# Patient Record
Sex: FEMALE | HISPANIC OR LATINO | Employment: FULL TIME | URBAN - METROPOLITAN AREA
[De-identification: names, ages, dates, MRNs, and addresses within clinical notes are randomized per-mention and may not be internally consistent; named-entity substitution may affect disease eponyms.]

---

## 2019-04-01 ENCOUNTER — OUTSIDE FACILITY SERVICE (OUTPATIENT)
Dept: NEUROLOGY | Facility: CLINIC | Age: 24
End: 2019-04-01

## 2019-04-01 PROCEDURE — 95886 MUSC TEST DONE W/N TEST COMP: CPT | Performed by: PSYCHIATRY & NEUROLOGY

## 2019-04-01 PROCEDURE — 95910 NRV CNDJ TEST 7-8 STUDIES: CPT | Performed by: PSYCHIATRY & NEUROLOGY

## 2020-09-04 ENCOUNTER — OFFICE VISIT (OUTPATIENT)
Dept: OBSTETRICS AND GYNECOLOGY | Facility: CLINIC | Age: 25
End: 2020-09-04

## 2020-09-04 VITALS
HEIGHT: 62 IN | DIASTOLIC BLOOD PRESSURE: 66 MMHG | HEART RATE: 89 BPM | BODY MASS INDEX: 28.71 KG/M2 | WEIGHT: 156 LBS | SYSTOLIC BLOOD PRESSURE: 106 MMHG

## 2020-09-04 DIAGNOSIS — Z30.09 ENCOUNTER FOR COUNSELING REGARDING CONTRACEPTION: ICD-10-CM

## 2020-09-04 DIAGNOSIS — N76.0 VAGINITIS AND VULVOVAGINITIS: ICD-10-CM

## 2020-09-04 DIAGNOSIS — Z01.419 WELL WOMAN EXAM WITH ROUTINE GYNECOLOGICAL EXAM: Primary | ICD-10-CM

## 2020-09-04 DIAGNOSIS — N64.52 NIPPLE DISCHARGE: ICD-10-CM

## 2020-09-04 PROCEDURE — 99385 PREV VISIT NEW AGE 18-39: CPT | Performed by: STUDENT IN AN ORGANIZED HEALTH CARE EDUCATION/TRAINING PROGRAM

## 2020-09-04 PROCEDURE — 99213 OFFICE O/P EST LOW 20 MIN: CPT | Performed by: STUDENT IN AN ORGANIZED HEALTH CARE EDUCATION/TRAINING PROGRAM

## 2020-09-04 RX ORDER — VALACYCLOVIR HYDROCHLORIDE 500 MG/1
TABLET, FILM COATED ORAL
COMMUNITY
Start: 2020-08-24 | End: 2020-09-26

## 2020-09-04 RX ORDER — TOPIRAMATE 50 MG/1
TABLET, FILM COATED ORAL
COMMUNITY
Start: 2020-06-03

## 2020-09-04 RX ORDER — SUMATRIPTAN 100 MG/1
TABLET, FILM COATED ORAL
COMMUNITY
Start: 2019-08-12 | End: 2023-02-23

## 2020-09-04 RX ORDER — ESCITALOPRAM OXALATE 10 MG/1
10 TABLET ORAL DAILY
COMMUNITY
Start: 2020-08-24 | End: 2023-03-10

## 2020-09-04 RX ORDER — CETIRIZINE HYDROCHLORIDE 10 MG/1
10 TABLET ORAL
COMMUNITY
Start: 2020-03-26 | End: 2023-03-10

## 2020-09-04 NOTE — PROGRESS NOTES
GYN Annual Exam     Chief Complaint   Patient presents with   • Follow-up     c/o irregular bleeding with Nexplanon that was inserted 3 years ago.  C/o vuluvar pain and burning with intercourse for 2 years and frequent vaginal infections. Also c/o still producing breast milk.         Parveen Hernandez is a 25 y.o. female who presents for annual well woman exam. She has multiple complaints today. She complains of irregular menstrual bleeding with a Nexplanon in place. It was inserted 3 years ago and she wishes it to be removed because of irregular bleeding. She reports that she bleeds more often than she is not bleeding and most of her bleeding is light. She also complains of frequent yeast infections over the last year but no recent symptoms. She also reports that for the past 2 years, she has been experiencing increased vulvar and vaginal swelling during and after intercourse. She reports that it is not as bad when her and her partner use condoms. She reports burns with urination just following intercourse. She has been with the same partner for several years. Lastly, she reports breast tenderness with her periods that has occurred since her last delivery. She massages her breasts for comfort because it feels like milk let down and her breasts express milky discharge, right breast > left breast.    OB History        1    Para   1    Term   1            AB        Living   1       SAB        TAB        Ectopic        Molar        Multiple        Live Births                    Current contraception: Nexplanon  History of abnormal Pap smear: no  Family history of uterine, colon or ovarian cancer: no  History of abnormal mammogram: no  Family history of breast cancer: no  Last Pap: Reports this year and normal.     Past Medical History:   Diagnosis Date   • Anxiety    • H/O cold sores    • Migraines        Past Surgical History:   Procedure Laterality Date   •  SECTION     • TONSILLECTOMY    "        Current Outpatient Medications:   •  cetirizine (zyrTEC) 10 MG tablet, Take 10 mg by mouth., Disp: , Rfl:   •  escitalopram (LEXAPRO) 10 MG tablet, Take 10 mg by mouth Daily., Disp: , Rfl:   •  SUMAtriptan (IMITREX) 100 MG tablet, , Disp: , Rfl:   •  topiramate (TOPAMAX) 50 MG tablet, TAKE 1 TABLET BY MOUTH EVERY NIGHT AS DIRECTED, Disp: , Rfl:   •  valACYclovir (VALTREX) 500 MG tablet, Take  by mouth., Disp: , Rfl:     No Known Allergies    Social History     Tobacco Use   • Smoking status: Never Smoker   • Smokeless tobacco: Never Used   Substance Use Topics   • Alcohol use: Yes     Comment: social   • Drug use: Never       Family History   Problem Relation Age of Onset   • Diabetes Maternal Grandfather        Review of Systems   Constitutional: Negative for chills and fever.   HENT: Negative for congestion and sore throat.    Eyes: Negative for blurred vision and double vision.   Respiratory: Negative for cough and shortness of breath.    Cardiovascular: Negative for chest pain and leg swelling.   Gastrointestinal: Negative for abdominal pain, constipation and diarrhea.   Endocrine: Negative for cold intolerance and heat intolerance.   Genitourinary: Positive for breast discharge, breast pain, dysuria, menstrual problem, vaginal discharge and vaginal pain. Negative for difficulty urinating, frequency and urgency.   Musculoskeletal: Negative for arthralgias and myalgias.   Skin: Negative for rash and bruise.   Allergic/Immunologic: Negative for environmental allergies and food allergies.   Neurological: Negative for dizziness and headache.   Hematological: Negative for adenopathy. Does not bruise/bleed easily.   Psychiatric/Behavioral: Negative for agitation. The patient is not nervous/anxious.        /66   Pulse 89   Ht 157.5 cm (62\")   Wt 70.8 kg (156 lb)   LMP 08/18/2020 (Approximate)   Breastfeeding No   BMI 28.53 kg/m²     Physical Exam   Constitutional: She is oriented to person, place, " and time. She appears well-developed and well-nourished. No distress.   HENT:   Head: Normocephalic and atraumatic.   Right Ear: External ear normal.   Left Ear: External ear normal.   Eyes: Conjunctivae and EOM are normal.   Neck: Normal range of motion. Neck supple.   Cardiovascular: Normal rate and regular rhythm.   Pulmonary/Chest: Effort normal. No respiratory distress.   Homogenous breasts. Nipples normal appearing without discharge. No skin changes. Breasts non-tender and no discrete masses palpated. No axillary adenopathy.    Abdominal: Soft. She exhibits no distension and no mass. There is no tenderness. There is no rebound and no guarding.   Genitourinary:   Genitourinary Comments: Normal external female genitalia, normal vulva, well estrogenized vaginal mucosa, cervix normal without lesions. Normal size, mobile, non-tender uterus. No adnexal masses or tenderness.    Musculoskeletal: Normal range of motion. She exhibits no deformity.   Neurological: She is alert and oriented to person, place, and time.   Skin: Skin is warm and dry.   Psychiatric: She has a normal mood and affect. Her behavior is normal.        Assessment     1) GYN annual well woman exam.   2) Bilateral breast tenderness   3) Vaginitis   4) Irregular menstrual bleeding  5) Contraception      Plan     1) Breast Health - Clinical breast exam yearly, Self breast awareness monthly. We discussed that her breast tenderness with menstrual cycles is likely related to hormonal changes. Will order prolactin to rule out hyperprolactinemia. Suspect induced lactation via breast stimulation.   2) Pap - Up to date  3) Smoking status- non-tender  4) Activity recommends - Adult 150-300 min/week of multi-component physical activities that include balance training, aerobic and physical strengthening.    Avoidance of distracted driving issues (texts, phone calls).   5) Follow up prn and one year.   6) Vulvovaginitis- No signs of inflammation or infection  noted on exam. We discussed that sexual arousal involves increased blood flow and swelling of the vagina and vulva. However, the patient reports this has worsened over the last 2 years. Given patient's history and improvement with condoms, suspect possible semen allergy. Advised use of condoms with sexual intercourse.   7) Irregular menstrual bleeding/contraception- We discussed that her menstrual bleeding pattern is likely iatrogenic related to Nexplanon implant. We discussed other forms of contraception with less risk of irregular menstrual bleeding including pills, rings, patches, injections, and intrauterine devices. Patient elects to proceed with IUD placement and will return to clinic for placement.       Estela Bo MD  9/7/2020  14:24

## 2020-09-07 PROBLEM — Z01.419 WELL WOMAN EXAM: Status: ACTIVE | Noted: 2020-09-04

## 2020-11-02 ENCOUNTER — TELEPHONE (OUTPATIENT)
Dept: OBSTETRICS AND GYNECOLOGY | Facility: CLINIC | Age: 25
End: 2020-11-02

## 2020-12-08 ENCOUNTER — TELEPHONE (OUTPATIENT)
Dept: OBSTETRICS AND GYNECOLOGY | Facility: CLINIC | Age: 25
End: 2020-12-08

## 2020-12-08 NOTE — TELEPHONE ENCOUNTER
Message left for patient to call office. She never had Prolactin level drawn and wanted to see if she was still interested in birth control device?

## 2020-12-10 ENCOUNTER — OFFICE VISIT (OUTPATIENT)
Dept: OBSTETRICS AND GYNECOLOGY | Facility: CLINIC | Age: 25
End: 2020-12-10

## 2020-12-10 VITALS
BODY MASS INDEX: 29.08 KG/M2 | SYSTOLIC BLOOD PRESSURE: 108 MMHG | WEIGHT: 158 LBS | HEIGHT: 62 IN | DIASTOLIC BLOOD PRESSURE: 64 MMHG

## 2020-12-10 DIAGNOSIS — Z30.46 ENCOUNTER FOR NEXPLANON REMOVAL: ICD-10-CM

## 2020-12-10 DIAGNOSIS — Z30.430 ENCOUNTER FOR INSERTION OF PARAGARD IUD: Primary | ICD-10-CM

## 2020-12-10 LAB
B-HCG UR QL: NEGATIVE
INTERNAL NEGATIVE CONTROL: NEGATIVE
INTERNAL POSITIVE CONTROL: POSITIVE
Lab: NORMAL

## 2020-12-10 PROCEDURE — 11982 REMOVE DRUG IMPLANT DEVICE: CPT | Performed by: STUDENT IN AN ORGANIZED HEALTH CARE EDUCATION/TRAINING PROGRAM

## 2020-12-10 PROCEDURE — 58300 INSERT INTRAUTERINE DEVICE: CPT | Performed by: STUDENT IN AN ORGANIZED HEALTH CARE EDUCATION/TRAINING PROGRAM

## 2020-12-10 PROCEDURE — 81025 URINE PREGNANCY TEST: CPT | Performed by: STUDENT IN AN ORGANIZED HEALTH CARE EDUCATION/TRAINING PROGRAM

## 2020-12-10 RX ADMIN — COPPER: 313.4 INTRAUTERINE DEVICE INTRAUTERINE at 15:00

## 2020-12-10 NOTE — PROGRESS NOTES
Procedure Note     Pre-Operative Diagnosis: 1. Desire for removal of Nexplanon device from left arm    Post-Operative Diagnosis: Same     Procedure: Nexplanon removal    Anesthetic: 2mL 1% plain Xylocaine     Estimated Blood Loss: Nil     Complications: no complications were noted     Counseling: Patient was seen and counseled.  Patient desires removal of the implant device because it has  and she desires to use a ParaGard intrauterine device.  She was counseled regarding ParaGard intrauterine device. The risks of removal were reviewed including bleeding, infection, damage to surrounding structures, and scarring.    Description of Procedure:   Patient was consented for procedure; questions were answered. The implant was identified in her left upper extremity . The area of the distal implant was cleaned with alcohol and injected with 2mL of 1% lidocaine.  The skin was prepped with betadine and incised approximately 0.4 cm perpendicular to the plane of the implant.  The implant was identified and grasped with a hemostat. The overlying capsule was dissected off with a hemostat, and the implant was removed in its entirety. The incision was covered with steri strips and a pressure dressing.  Patient tolerated the procedure well.     Estela Bo MD

## 2020-12-12 PROBLEM — Z30.430 ENCOUNTER FOR IUD INSERTION: Status: ACTIVE | Noted: 2020-12-10

## 2020-12-12 RX ORDER — COPPER 313.4 MG/1
INTRAUTERINE DEVICE INTRAUTERINE ONCE
Status: COMPLETED | OUTPATIENT
Start: 2020-12-12 | End: 2020-12-10

## 2020-12-12 NOTE — PROGRESS NOTES
ParaGard IUD Insertion Procedure Note    Indications: Contraception    Pre Procedural Diagnosis: Encounter for insertion of ParaGard intrauterine device     Post Procedural Diagnosis: Successful encounter for insertion of ParaGard intrauterine device      Counseling:  Patient was counseled on risks of IUD insertion including but not limited to abnormal bleeding, infection, uterine perforation, expulsion, failure of contraception resulting in pregnancy, need for additional procedures and/or need for surgery.  All questions were answered to apparent satisfaction.  She was counseled about the duration of treatment, recommended removal in 10 years.  She was advised to perform monthly string checks and to see evaluation with unexpected changes in bleeding patterns and/or unable to feel the strings.      Procedure Details   Urine pregnancy test was done, and result was negative.  Gonorrhea/chlamydia testing was not done.    Bimanual exam revealed normal size, anteverted uterus. Cervix cleansed with Betadine. Tenaculum applied to the anterior lip.  Uterus sounded to 8 cm. IUD inserted without difficulty. String visible and trimmed. Patient tolerated procedure well.    IUD Information:  See medication record.    Condition:  Stable    Complications:  None    Plan:    The patient was advised to call for any fever or for prolonged or severe pain or bleeding; she was also advised to use a back up method of contraception for 7 days or abstain from intercourse. She was advised to use OTC ibuprofen as needed for mild to moderate pain.  Return to the clinic in 4 weeks for follow-up.    Estela Bo MD

## 2020-12-23 ENCOUNTER — TELEPHONE (OUTPATIENT)
Dept: OBSTETRICS AND GYNECOLOGY | Facility: CLINIC | Age: 25
End: 2020-12-23

## 2020-12-23 DIAGNOSIS — N76.0 BV (BACTERIAL VAGINOSIS): Primary | ICD-10-CM

## 2020-12-23 DIAGNOSIS — B96.89 BV (BACTERIAL VAGINOSIS): Primary | ICD-10-CM

## 2020-12-23 RX ORDER — METRONIDAZOLE 500 MG/1
500 TABLET ORAL 2 TIMES DAILY
Qty: 14 TABLET | Refills: 0 | Status: SHIPPED | OUTPATIENT
Start: 2020-12-23 | End: 2020-12-30

## 2020-12-23 NOTE — TELEPHONE ENCOUNTER
Patient called stating that she thinks she has BV and would like something called into the pharmacy.    Patient is complaining of an odor but no discharge.      Thanks,    Marisa

## 2021-01-07 ENCOUNTER — OFFICE VISIT (OUTPATIENT)
Dept: OBSTETRICS AND GYNECOLOGY | Facility: CLINIC | Age: 26
End: 2021-01-07

## 2021-01-07 VITALS
DIASTOLIC BLOOD PRESSURE: 65 MMHG | SYSTOLIC BLOOD PRESSURE: 111 MMHG | WEIGHT: 158 LBS | BODY MASS INDEX: 29.08 KG/M2 | HEIGHT: 62 IN

## 2021-01-07 DIAGNOSIS — Z97.5 IUD (INTRAUTERINE DEVICE) IN PLACE: Primary | ICD-10-CM

## 2021-01-07 PROCEDURE — 99212 OFFICE O/P EST SF 10 MIN: CPT | Performed by: STUDENT IN AN ORGANIZED HEALTH CARE EDUCATION/TRAINING PROGRAM

## 2021-01-07 NOTE — PROGRESS NOTES
"String Check  Chief Complaint   Patient presents with   • Follow-up     paragard check, doing good      Parveen Hernandez is a 25 y.o.  who presented after placement of ParaGard IUD on 12/10/20. She reports that she had her period the day after placement that lasted 3 weeks. It has since stopped and she reports that she is doing well without complaints today.      OBJECTIVE:  /65   Ht 157.5 cm (62.01\")   Wt 71.7 kg (158 lb)   BMI 28.89 kg/m²    Gen: NAD  Pelvic: IUD strings seen and appear appropriate in length. No cervical motion tenderness. Uterus non-tender, mobile, normal size. No adnexal masses or tenderness.     ASSESSMENT:   IUD in place    PLAN:   Patient doing well with IUD in place. We again discussed that the ParaGard IUD commonly has side effect of heavy periods. Patient to contact office with issues or concerns. Follow up in 1 year for annual exam or earlier as indicated.    Estela Bo MD     "

## 2021-01-28 ENCOUNTER — OFFICE VISIT (OUTPATIENT)
Dept: OBSTETRICS AND GYNECOLOGY | Facility: CLINIC | Age: 26
End: 2021-01-28

## 2021-01-28 VITALS
HEIGHT: 62 IN | WEIGHT: 158 LBS | SYSTOLIC BLOOD PRESSURE: 108 MMHG | BODY MASS INDEX: 29.08 KG/M2 | DIASTOLIC BLOOD PRESSURE: 59 MMHG

## 2021-01-28 DIAGNOSIS — R10.9 ABDOMINAL CRAMPING: ICD-10-CM

## 2021-01-28 DIAGNOSIS — Z97.5 BREAKTHROUGH BLEEDING ASSOCIATED WITH INTRAUTERINE DEVICE (IUD): Primary | ICD-10-CM

## 2021-01-28 DIAGNOSIS — N89.8 VAGINAL DISCHARGE: ICD-10-CM

## 2021-01-28 DIAGNOSIS — N92.1 BREAKTHROUGH BLEEDING ASSOCIATED WITH INTRAUTERINE DEVICE (IUD): Primary | ICD-10-CM

## 2021-01-28 PROCEDURE — 99213 OFFICE O/P EST LOW 20 MIN: CPT | Performed by: STUDENT IN AN ORGANIZED HEALTH CARE EDUCATION/TRAINING PROGRAM

## 2021-02-01 LAB
A VAGINAE DNA VAG QL NAA+PROBE: ABNORMAL SCORE
BVAB2 DNA VAG QL NAA+PROBE: ABNORMAL SCORE
C ALBICANS DNA VAG QL NAA+PROBE: NEGATIVE
C GLABRATA DNA VAG QL NAA+PROBE: NEGATIVE
MEGA1 DNA VAG QL NAA+PROBE: ABNORMAL SCORE

## 2021-02-02 ENCOUNTER — TELEPHONE (OUTPATIENT)
Dept: OBSTETRICS AND GYNECOLOGY | Facility: CLINIC | Age: 26
End: 2021-02-02

## 2021-02-02 DIAGNOSIS — N76.0 RECURRENT VAGINITIS: Primary | ICD-10-CM

## 2021-02-02 DIAGNOSIS — B96.89 BV (BACTERIAL VAGINOSIS): ICD-10-CM

## 2021-02-02 DIAGNOSIS — N76.0 BV (BACTERIAL VAGINOSIS): ICD-10-CM

## 2021-02-02 RX ORDER — ACETAMINOPHEN AND CODEINE PHOSPHATE 120; 12 MG/5ML; MG/5ML
1 SOLUTION ORAL DAILY
Qty: 28 TABLET | Refills: 0 | Status: SHIPPED | OUTPATIENT
Start: 2021-02-02 | End: 2021-02-11

## 2021-02-02 RX ORDER — METRONIDAZOLE 500 MG/1
500 TABLET ORAL 2 TIMES DAILY
Qty: 14 TABLET | Refills: 0 | Status: SHIPPED | OUTPATIENT
Start: 2021-02-02 | End: 2021-02-09

## 2021-02-02 RX ORDER — BORIC ACID
600 POWDER (GRAM) MISCELLANEOUS 2 TIMES WEEKLY
Qty: 24 SUPPOSITORY | Refills: 4 | Status: SHIPPED | OUTPATIENT
Start: 2021-02-04 | End: 2021-02-11

## 2021-02-02 NOTE — TELEPHONE ENCOUNTER
Called patient regarding results of ultrasound and vaginal swab. The patient report that her bleeding has since stopped. I discussed that if she has prolonged bleeding again, she may take micronor for one month to see if it can help regulate her bleeding from the ParaGard. Reviewed that the pelvic ultrasound showed that the IUD was in the appropriate position. Also reviewed vaginal swab result that showed BV. Sent prescription for flagyl 500 mg BID x 7 days. Once she has finished this the patient is to start boric acid vaginal suppositories twice a week for treatment of recurrent vaginitis. All questions and concerns answered.    Estela Bo MD  2/2/2021  17:02 EST

## 2021-02-11 ENCOUNTER — OFFICE VISIT (OUTPATIENT)
Dept: OBSTETRICS AND GYNECOLOGY | Facility: CLINIC | Age: 26
End: 2021-02-11

## 2021-02-11 VITALS
HEART RATE: 100 BPM | SYSTOLIC BLOOD PRESSURE: 121 MMHG | HEIGHT: 62 IN | DIASTOLIC BLOOD PRESSURE: 73 MMHG | WEIGHT: 161 LBS | BODY MASS INDEX: 29.63 KG/M2

## 2021-02-11 DIAGNOSIS — Z97.5 IUD (INTRAUTERINE DEVICE) IN PLACE: ICD-10-CM

## 2021-02-11 DIAGNOSIS — N89.8 VAGINAL DISCHARGE: Primary | ICD-10-CM

## 2021-02-11 DIAGNOSIS — R10.2 PELVIC PAIN: ICD-10-CM

## 2021-02-11 PROCEDURE — 99213 OFFICE O/P EST LOW 20 MIN: CPT | Performed by: STUDENT IN AN ORGANIZED HEALTH CARE EDUCATION/TRAINING PROGRAM

## 2021-02-11 RX ORDER — ESCITALOPRAM OXALATE 10 MG/1
10 TABLET ORAL DAILY
COMMUNITY
Start: 2020-08-24 | End: 2022-12-12

## 2021-02-11 NOTE — PROGRESS NOTES
"CC: Vaginal discharge and irregular bleeding with ParaGard IUD     SUBJECTIVE:     Parveen Hernandez is a 25 y.o.  who presents with pink discharge and irregular bleeding with ParaGard IUD. The patient was last seen on 21 for breakthrough bleeding with the ParaGard IUD and pelvic pain. The IUD was noted to be in appropriate position on ultrasound and she was treated for bacterial vaginosis at that time. She feels like she still might have a vaginal infection because she is having pink vaginal discharge after finishing flagyl antibiotic two days ago. It is off and on foul smelling. Denies vaginal irritation or itching. She also has pelvic pain that is intermittent and sharp. It radiates down her left leg occasionally. She has not taken in medication. The last time she experienced it was this morning. She was having vaginal bleeding every day for the last month but this has improved and she had a regular period earlier this month.     Past Medical History:   Diagnosis Date   • Anxiety    • H/O cold sores    • Migraines       Past Surgical History:   Procedure Laterality Date   •  SECTION     • TONSILLECTOMY        Social History     Tobacco Use   • Smoking status: Never Smoker   • Smokeless tobacco: Never Used   Substance Use Topics   • Alcohol use: Yes     Comment: social   • Drug use: Never     OB History    Para Term  AB Living   1 1 1     1   SAB TAB Ectopic Molar Multiple Live Births                    # Outcome Date GA Lbr Bello/2nd Weight Sex Delivery Anes PTL Lv   1 Term                 Review of Systems   Gastrointestinal: Positive for abdominal pain.   Genitourinary: Positive for pelvic pain and vaginal discharge.       OBJECTIVE:   Vitals:    21 1200   BP: 121/73   Pulse: 100   Weight: 73 kg (161 lb)   Height: 157.5 cm (62\")        Physical Exam  Vitals signs reviewed. Exam conducted with a chaperone present.   Constitutional:       Appearance: Normal appearance.   HENT: "      Head: Normocephalic and atraumatic.      Right Ear: External ear normal.      Left Ear: External ear normal.   Eyes:      Extraocular Movements: Extraocular movements intact.      Pupils: Pupils are equal, round, and reactive to light.   Neck:      Musculoskeletal: Normal range of motion and neck supple.   Pulmonary:      Effort: Pulmonary effort is normal. No respiratory distress.   Abdominal:      General: There is no distension.      Palpations: Abdomen is soft. There is no mass.      Tenderness: There is no abdominal tenderness. There is no guarding or rebound.      Hernia: No hernia is present.   Genitourinary:     General: Normal vulva.      Exam position: Lithotomy position.      Labia:         Right: No rash, tenderness, lesion or injury.         Left: No rash, tenderness, lesion or injury.       Urethra: No prolapse or urethral swelling.      Vagina: Vaginal discharge present. No tenderness, bleeding or lesions.      Cervix: Normal.      Uterus: Normal. Not enlarged, not fixed and not tender.       Adnexa: Right adnexa normal and left adnexa normal.        Right: No mass, tenderness or fullness.          Left: No mass, tenderness or fullness.        Comments: IUD strings visualized at cervical os at appropriate length.   Yellow thin vaginal discharge in the vault.   Musculoskeletal: Normal range of motion.         General: No swelling.   Lymphadenopathy:      Lower Body: No right inguinal adenopathy. No left inguinal adenopathy.   Skin:     General: Skin is warm and dry.   Neurological:      General: No focal deficit present.      Mental Status: She is alert and oriented to person, place, and time.   Psychiatric:         Mood and Affect: Mood normal.         Behavior: Behavior normal.         ASSESSMENT:     ICD-10-CM ICD-9-CM   1. Vaginal discharge  N89.8 623.5   2. Pelvic pain  R10.2 [id number]   3. IUD (intrauterine device) in place  Z97.5 V45.51       PLAN:   NuSwab + collected to evaluate ongoing  discharge that did not improve with recent flagyl treatment. Will notify patient of results and need for further treatment.  We discussed that her pelvic pain may be related to the IUD and she may be sensitive to it in her uterus. Placement in the uterus was confirmed previously with ultrasound at her last appointment on 1/28/21. Will see if pain is possibly related to pelvic infection and advised the patient to continue to monitor her pain and take ibuprofen as needed. Consider removal of IUD if this pain is going and considerably bothersome to the patient in the next few months. Patient agrees with plan of care and will notify the office of concerns. All questions answered.    See below for orders    Orders Placed This Encounter   Procedures   • NuSwab VG+ - Swab, Vagina      No follow-ups on file.     Estela Bo MD

## 2021-02-13 LAB
A VAGINAE DNA VAG QL NAA+PROBE: ABNORMAL SCORE
BVAB2 DNA VAG QL NAA+PROBE: ABNORMAL SCORE
C ALBICANS DNA VAG QL NAA+PROBE: NEGATIVE
C GLABRATA DNA VAG QL NAA+PROBE: NEGATIVE
C TRACH DNA VAG QL NAA+PROBE: POSITIVE
MEGA1 DNA VAG QL NAA+PROBE: ABNORMAL SCORE
N GONORRHOEA DNA VAG QL NAA+PROBE: NEGATIVE
T VAGINALIS DNA VAG QL NAA+PROBE: NEGATIVE

## 2021-02-15 ENCOUNTER — TELEPHONE (OUTPATIENT)
Dept: OBSTETRICS AND GYNECOLOGY | Facility: CLINIC | Age: 26
End: 2021-02-15

## 2021-02-15 RX ORDER — AZITHROMYCIN 500 MG/1
1000 TABLET, FILM COATED ORAL ONCE
Qty: 2 TABLET | Refills: 0 | Status: SHIPPED | OUTPATIENT
Start: 2021-02-15 | End: 2021-02-15

## 2021-02-15 NOTE — TELEPHONE ENCOUNTER
Called patient and reviewed recent vaginal swab result that demonstrated chlamydia. Reviewed that this is a sexually transmitted disease that if not treated can result in pelvic inflammatory disease and infertility. Reviewed treatment with azithromycin 1000 mg PO x 1 dose and sent prescription to patient's pharmacy. Patient advised to tell partner and have partner seek treatment. They must abstain from intercourse for 7 days following treatment. All questions answered.    Estela Bo MD  2/15/2021  16:29 EST

## 2021-02-17 ENCOUNTER — TELEPHONE (OUTPATIENT)
Dept: OBSTETRICS AND GYNECOLOGY | Facility: CLINIC | Age: 26
End: 2021-02-17

## 2021-02-28 DIAGNOSIS — Z97.5 BREAKTHROUGH BLEEDING ASSOCIATED WITH INTRAUTERINE DEVICE (IUD): ICD-10-CM

## 2021-02-28 DIAGNOSIS — N92.1 BREAKTHROUGH BLEEDING ASSOCIATED WITH INTRAUTERINE DEVICE (IUD): ICD-10-CM

## 2021-03-01 RX ORDER — ACETAMINOPHEN AND CODEINE PHOSPHATE 120; 12 MG/5ML; MG/5ML
1 SOLUTION ORAL DAILY
Qty: 28 TABLET | Refills: 0 | OUTPATIENT
Start: 2021-03-01 | End: 2022-03-01

## 2021-03-30 ENCOUNTER — TELEPHONE (OUTPATIENT)
Dept: OBSTETRICS AND GYNECOLOGY | Facility: CLINIC | Age: 26
End: 2021-03-30

## 2021-03-30 NOTE — TELEPHONE ENCOUNTER
Called pt about n/s on 3/26/21, pt states she was told that  was not going to make it, pt will call back and [email or web address]

## 2021-04-14 ENCOUNTER — OFFICE VISIT (OUTPATIENT)
Dept: OBSTETRICS AND GYNECOLOGY | Facility: CLINIC | Age: 26
End: 2021-04-14

## 2021-04-14 VITALS
SYSTOLIC BLOOD PRESSURE: 107 MMHG | DIASTOLIC BLOOD PRESSURE: 74 MMHG | WEIGHT: 158 LBS | BODY MASS INDEX: 29.08 KG/M2 | HEIGHT: 62 IN

## 2021-04-14 DIAGNOSIS — N89.8 VAGINAL DISCHARGE: Primary | ICD-10-CM

## 2021-04-14 DIAGNOSIS — Z11.3 SCREENING EXAMINATION FOR STD (SEXUALLY TRANSMITTED DISEASE): ICD-10-CM

## 2021-04-14 DIAGNOSIS — Z86.19 HISTORY OF CHLAMYDIA: ICD-10-CM

## 2021-04-14 PROCEDURE — 99213 OFFICE O/P EST LOW 20 MIN: CPT | Performed by: STUDENT IN AN ORGANIZED HEALTH CARE EDUCATION/TRAINING PROGRAM

## 2021-04-14 RX ORDER — FLUCONAZOLE 150 MG/1
150 TABLET ORAL ONCE
Qty: 1 TABLET | Refills: 0 | Status: SHIPPED | OUTPATIENT
Start: 2021-04-14 | End: 2021-04-14

## 2021-04-14 NOTE — PROGRESS NOTES
"Chief Complaint   Patient presents with   • Follow-up     recheck swab        SUBJECTIVE:     Parveen Hernandez is a 25 y.o.  who presents for recheck for vaginitis. The patient was last seen in 21 and tested positive for chlamydia. She has taken treatment since that time but desires to be rechecked for infection. She states that her partner was checked and he tested negative. She reports that she has been with the same partner for the last 8 months. She is wondering if she got it from him or before that. She has history of recurrent bacterial vaginosis and is having discharge at this time. She reports it is white and foul smelling. Denies vaginal itching or irritation. She has a ParaGard IUD in place.     Past Medical History:   Diagnosis Date   • Anxiety    • H/O cold sores    • Migraines       Past Surgical History:   Procedure Laterality Date   •  SECTION     • TONSILLECTOMY        Social History     Tobacco Use   • Smoking status: Never Smoker   • Smokeless tobacco: Never Used   Substance Use Topics   • Alcohol use: Yes     Comment: social   • Drug use: Never     OB History    Para Term  AB Living   1 1 1     1   SAB TAB Ectopic Molar Multiple Live Births                    # Outcome Date GA Lbr Bello/2nd Weight Sex Delivery Anes PTL Lv   1 Term                 Review of Systems   Genitourinary: Positive for vaginal discharge. Negative for menstrual problem and vaginal bleeding.       OBJECTIVE:   Vitals:    21 1432   BP: 107/74   Weight: 71.7 kg (158 lb)   Height: 157.5 cm (62.01\")        Physical Exam  Vitals reviewed. Exam conducted with a chaperone present.   Constitutional:       Appearance: Normal appearance.   HENT:      Head: Normocephalic and atraumatic.      Right Ear: External ear normal.      Left Ear: External ear normal.   Eyes:      Extraocular Movements: Extraocular movements intact.      Pupils: Pupils are equal, round, and reactive to light.   Pulmonary:      " Effort: Pulmonary effort is normal. No respiratory distress.   Abdominal:      General: There is no distension.      Palpations: Abdomen is soft.      Tenderness: There is no abdominal tenderness. There is no guarding or rebound.   Genitourinary:     General: Normal vulva.      Exam position: Lithotomy position.      Labia:         Right: No rash, tenderness, lesion or injury.         Left: No rash, tenderness, lesion or injury.       Urethra: No prolapse or urethral swelling.      Vagina: Vaginal discharge present. No erythema, tenderness, bleeding or lesions.      Cervix: Normal.      Uterus: Normal. Not enlarged, not fixed and not tender.       Adnexa: Right adnexa normal and left adnexa normal.        Right: No mass, tenderness or fullness.          Left: No mass, tenderness or fullness.        Comments: Thick white discharge in vaginal vault.   Musculoskeletal:         General: No deformity. Normal range of motion.      Cervical back: Normal range of motion and neck supple.   Lymphadenopathy:      Lower Body: No right inguinal adenopathy. No left inguinal adenopathy.   Skin:     General: Skin is warm and dry.   Neurological:      General: No focal deficit present.      Mental Status: She is alert and oriented to person, place, and time.   Psychiatric:         Mood and Affect: Mood normal.         Behavior: Behavior normal.         ASSESSMENT:     ICD-10-CM ICD-9-CM   1. Vaginal discharge  N89.8 623.5   2. Screening examination for STD (sexually transmitted disease)  Z11.3 V74.5   3. History of chlamydia  Z86.19 V12.09       PLAN:   Nuab+ collected for evaluation for vaginitis causing vaginal discharge and STD screening given past history of chlamydia. We discussed that chlamydia is sexually transmitted and she may have acquired it prior to her current partner because he tested negative and chlamydia can be an indolent infection in females. Will presumptive treat for yeast infection based on exam with diflucan  150 mg PO x 1 dose. Will notify patient of results of vaginal swab. All questions and concerns answered. F/u as needed.     See below for orders    Orders Placed This Encounter   Procedures   • NuSwab VG+ - Swab, Vagina     Order Specific Question:   Release to patient     Answer:   Immediate     Estela Bo MD

## 2021-04-17 LAB
A VAGINAE DNA VAG QL NAA+PROBE: ABNORMAL SCORE
BVAB2 DNA VAG QL NAA+PROBE: ABNORMAL SCORE
C ALBICANS DNA VAG QL NAA+PROBE: NEGATIVE
C GLABRATA DNA VAG QL NAA+PROBE: NEGATIVE
C TRACH DNA VAG QL NAA+PROBE: NEGATIVE
MEGA1 DNA VAG QL NAA+PROBE: ABNORMAL SCORE
N GONORRHOEA DNA VAG QL NAA+PROBE: NEGATIVE
T VAGINALIS DNA VAG QL NAA+PROBE: NEGATIVE

## 2021-04-19 DIAGNOSIS — B96.89 BV (BACTERIAL VAGINOSIS): Primary | ICD-10-CM

## 2021-04-19 DIAGNOSIS — N76.0 BV (BACTERIAL VAGINOSIS): Primary | ICD-10-CM

## 2021-04-19 RX ORDER — METRONIDAZOLE 500 MG/1
500 TABLET ORAL 2 TIMES DAILY
Qty: 14 TABLET | Refills: 0 | Status: SHIPPED | OUTPATIENT
Start: 2021-04-19 | End: 2021-04-26

## 2021-05-13 DIAGNOSIS — N64.52 NIPPLE DISCHARGE: Primary | ICD-10-CM

## 2021-05-13 NOTE — PROGRESS NOTES
Spoke with pt because she was on the nurse schedule for a Prolactin. Our  had to leave for an emergency, I noticed order had , pt states she is still having breast leakage and she still wanted to do tests. Order placed and patient will come in at her convenience.

## 2022-08-02 ENCOUNTER — TELEPHONE (OUTPATIENT)
Dept: OBSTETRICS AND GYNECOLOGY | Facility: CLINIC | Age: 27
End: 2022-08-02

## 2022-08-26 ENCOUNTER — OFFICE VISIT (OUTPATIENT)
Dept: OBSTETRICS AND GYNECOLOGY | Facility: CLINIC | Age: 27
End: 2022-08-26

## 2022-08-26 VITALS
DIASTOLIC BLOOD PRESSURE: 73 MMHG | BODY MASS INDEX: 29.26 KG/M2 | SYSTOLIC BLOOD PRESSURE: 115 MMHG | WEIGHT: 159 LBS | HEIGHT: 62 IN

## 2022-08-26 DIAGNOSIS — Z97.5 IUD (INTRAUTERINE DEVICE) IN PLACE: ICD-10-CM

## 2022-08-26 DIAGNOSIS — R10.32 LLQ ABDOMINAL PAIN: ICD-10-CM

## 2022-08-26 DIAGNOSIS — Z12.4 CERVICAL CANCER SCREENING: ICD-10-CM

## 2022-08-26 DIAGNOSIS — Z01.419 WELL WOMAN EXAM WITH ROUTINE GYNECOLOGICAL EXAM: Primary | ICD-10-CM

## 2022-08-26 PROCEDURE — 2014F MENTAL STATUS ASSESS: CPT | Performed by: STUDENT IN AN ORGANIZED HEALTH CARE EDUCATION/TRAINING PROGRAM

## 2022-08-26 PROCEDURE — 99395 PREV VISIT EST AGE 18-39: CPT | Performed by: STUDENT IN AN ORGANIZED HEALTH CARE EDUCATION/TRAINING PROGRAM

## 2022-08-26 NOTE — PROGRESS NOTES
GYN Annual Exam     CC- Here for annual exam.     Parveen Hernandez is a 27 y.o. female who presents for annual well woman exam. Periods are regular every 28-30 days, lasting 7 days. Dysmenorrhea:mild, occurring first 1-2 days of flow. Cyclic symptoms include none. No intermenstrual bleeding, spotting, or discharge.  She has been experiencing left lower abdominal pain that is sharp in quality during her period for the last 3 months. She denies alleviating or aggravating factors other than it occurs with her period. Does not palpate a mass in area of pain during period. She is worried that she did something at her  section incision that was performed 6 years ago.     OB History        1    Para   1    Term   1            AB        Living   1       SAB        IAB        Ectopic        Molar        Multiple        Live Births                H/o C/S x 1, emergency  for fetal distress, she reports getting to 8 cm dilation     Current contraception: Paragard IUD  History of abnormal Pap smear: no  Family history of uterine, colon or ovarian cancer: no  History of abnormal mammogram: n/a  Family history of breast cancer: no  Last Pap : unsure    Past Medical History:   Diagnosis Date   • Anxiety    • H/O cold sores    • Migraines        Past Surgical History:   Procedure Laterality Date   •  SECTION     • TONSILLECTOMY           Current Outpatient Medications:   •  escitalopram (LEXAPRO) 10 MG tablet, Take 10 mg by mouth Daily., Disp: , Rfl:   •  SUMAtriptan (IMITREX) 100 MG tablet, , Disp: , Rfl:   •  topiramate (TOPAMAX) 50 MG tablet, TAKE 1 TABLET BY MOUTH EVERY NIGHT AS DIRECTED, Disp: , Rfl:   •  cetirizine (zyrTEC) 10 MG tablet, Take 10 mg by mouth., Disp: , Rfl:   •  escitalopram (LEXAPRO) 10 MG tablet, Take 10 mg by mouth Daily., Disp: , Rfl:     No Known Allergies    Social History     Tobacco Use   • Smoking status: Never Smoker   • Smokeless tobacco: Never Used   Substance Use  "Topics   • Alcohol use: Yes     Comment: social   • Drug use: Never       Family History   Problem Relation Age of Onset   • Diabetes Maternal Grandfather        Review of Systems   Gastrointestinal: Positive for abdominal pain.   All other systems reviewed and are negative.      /73   Ht 157.5 cm (62.01\")   Wt 72.1 kg (159 lb)   LMP 2022   BMI 29.07 kg/m²     Physical Exam  Vitals reviewed. Exam conducted with a chaperone present.   Constitutional:       General: She is not in acute distress.  HENT:      Head: Normocephalic and atraumatic.      Right Ear: External ear normal.      Left Ear: External ear normal.   Eyes:      Extraocular Movements: Extraocular movements intact.      Pupils: Pupils are equal, round, and reactive to light.   Cardiovascular:      Rate and Rhythm: Normal rate and regular rhythm.   Pulmonary:      Effort: Pulmonary effort is normal. No respiratory distress.   Chest:   Breasts: Breasts are symmetrical.      Right: No swelling, bleeding, inverted nipple, mass, nipple discharge, skin change, tenderness, axillary adenopathy or supraclavicular adenopathy.      Left: No swelling, bleeding, inverted nipple, mass, nipple discharge, skin change, tenderness, axillary adenopathy or supraclavicular adenopathy.       Abdominal:      General: There is no distension.      Palpations: Abdomen is soft.      Tenderness: There is no abdominal tenderness. There is no guarding or rebound.      Comments: Fainted  section scar.    Genitourinary:     General: Normal vulva.      Exam position: Lithotomy position.      Labia:         Right: No rash, tenderness, lesion or injury.         Left: No rash, tenderness, lesion or injury.       Urethra: No prolapse or urethral swelling.      Vagina: No vaginal discharge, erythema, tenderness, bleeding or lesions.      Cervix: Normal.      Uterus: Not enlarged, not fixed and not tender.       Adnexa:         Right: No mass, tenderness or fullness.   "        Left: No mass, tenderness or fullness.        Comments: IUD strings visualized at 1 cm from external cervical os.   Musculoskeletal:         General: No deformity. Normal range of motion.      Cervical back: Normal range of motion and neck supple.   Lymphadenopathy:      Upper Body:      Right upper body: No supraclavicular or axillary adenopathy.      Left upper body: No supraclavicular or axillary adenopathy.      Lower Body: No right inguinal adenopathy. No left inguinal adenopathy.   Skin:     General: Skin is warm and dry.   Neurological:      General: No focal deficit present.      Mental Status: She is alert and oriented to person, place, and time.   Psychiatric:         Mood and Affect: Mood normal.         Behavior: Behavior normal.       Assessment     1) GYN annual well woman exam.   2) Cervical cancer screening  3) Contraception- IUD in place   4) LLQ pain      Plan     1) Breast Health - Clinical breast exam yearly, Self breast awareness monthly  2) Pap - Collected pap smear today for cervical cancer screening.   3) Smoking status- non-smoker   4) Activity recommends - Adult 150-300 min/week of multi-component physical activities that include balance training, aerobic and physical strengthening.    5) Contraception- Paragard IUD in place.  6) LLQ pain- Will have patient return for a pelvic ultrasound during her period to evaluate for possible etiology such as ovarian cyst, endometrioma in the anterior abdominal wall. Order placed.   7) Follow up for ultrasound in 4 weeks.        Estela Bo MD  8/26/2022  18:54 EDT

## 2022-09-02 LAB
CONV .: NORMAL
CYTOLOGIST CVX/VAG CYTO: NORMAL
CYTOLOGY CVX/VAG DOC CYTO: NORMAL
CYTOLOGY CVX/VAG DOC THIN PREP: NORMAL
DX ICD CODE: NORMAL
HIV 1 & 2 AB SER-IMP: NORMAL
Lab: NORMAL
OTHER STN SPEC: NORMAL
STAT OF ADQ CVX/VAG CYTO-IMP: NORMAL

## 2022-09-07 ENCOUNTER — TELEPHONE (OUTPATIENT)
Dept: OBSTETRICS AND GYNECOLOGY | Facility: CLINIC | Age: 27
End: 2022-09-07

## 2022-09-07 NOTE — TELEPHONE ENCOUNTER
----- Message from Estela Bo MD sent at 9/6/2022  5:11 PM EDT -----  Please call and let her know that her pap smear returned normal. Thanks!

## 2022-09-07 NOTE — TELEPHONE ENCOUNTER
Spoke with Parveen to let her know that Dr Bo said your pap smear was normal. She wants to get her paraguard remove and I told her that would require a separate appointment from the [place] on 9/23/22. Thank you.

## 2022-12-09 ENCOUNTER — OFFICE VISIT (OUTPATIENT)
Dept: OBSTETRICS AND GYNECOLOGY | Facility: CLINIC | Age: 27
End: 2022-12-09

## 2022-12-09 VITALS
WEIGHT: 160 LBS | BODY MASS INDEX: 29.44 KG/M2 | SYSTOLIC BLOOD PRESSURE: 117 MMHG | HEIGHT: 62 IN | DIASTOLIC BLOOD PRESSURE: 72 MMHG

## 2022-12-09 DIAGNOSIS — R10.2 PELVIC PAIN: Primary | ICD-10-CM

## 2022-12-09 DIAGNOSIS — N89.8 VAGINAL DISCHARGE: ICD-10-CM

## 2022-12-09 DIAGNOSIS — Z30.432 ENCOUNTER FOR REMOVAL OF INTRAUTERINE CONTRACEPTIVE DEVICE (IUD): ICD-10-CM

## 2022-12-09 DIAGNOSIS — Z30.40 ENCOUNTER FOR SURVEILLANCE OF CONTRACEPTIVES, UNSPECIFIED CONTRACEPTIVE: ICD-10-CM

## 2022-12-09 PROCEDURE — 99214 OFFICE O/P EST MOD 30 MIN: CPT | Performed by: NURSE PRACTITIONER

## 2022-12-09 PROCEDURE — 58301 REMOVE INTRAUTERINE DEVICE: CPT | Performed by: NURSE PRACTITIONER

## 2022-12-09 RX ORDER — MEDROXYPROGESTERONE ACETATE 150 MG/ML
150 INJECTION, SUSPENSION INTRAMUSCULAR
Qty: 1 ML | Refills: 3 | Status: SHIPPED | OUTPATIENT
Start: 2022-12-09 | End: 2023-03-10 | Stop reason: ALTCHOICE

## 2022-12-09 RX ORDER — FAMOTIDINE 20 MG/1
20 TABLET, FILM COATED ORAL 2 TIMES DAILY
COMMUNITY
Start: 2022-09-20

## 2022-12-09 NOTE — PROGRESS NOTES
"Chief Complaint   Patient presents with   • Follow-up     Discuss u/s      SUBJECTIVE:     Parveen Hernandez is a 27 y.o.  who presents with c/o pelvic pain for several months. She did report this pain at her last visit in August, but did not f/u for U/s.  Pain is intermittent. C/o vaginal discharge for one week. Denies itching of odor. Denies dysuria. Denies new partners.  She is spotting today, expecting menses to start tomorrow.     This is my first time meeting Parveen Hernandez  She is a patient of Dr. Dominguez.    Past Medical History:   Diagnosis Date   • Anxiety    • H/O cold sores    • Migraines       Past Surgical History:   Procedure Laterality Date   •  SECTION     • TONSILLECTOMY          Review of Systems   Constitutional: Negative for chills, fatigue and fever.   Gastrointestinal: Negative for abdominal distention, abdominal pain, nausea and vomiting.   Genitourinary: Positive for pelvic pain and vaginal discharge. Negative for dyspareunia, dysuria, menstrual problem, vaginal bleeding and vaginal pain.        - vaginal itching  - vaginal odor   Musculoskeletal: Negative for gait problem.       OBJECTIVE:   Vitals:    22 1405   BP: 117/72   Weight: 72.6 kg (160 lb)   Height: 157.5 cm (62.01\")        Physical Exam  Constitutional:       General: She is not in acute distress.     Appearance: Normal appearance. She is not ill-appearing, toxic-appearing or diaphoretic.   Genitourinary:      Bladder and urethral meatus normal.      No lesions in the vagina.      Right Labia: No rash, tenderness, lesions, skin changes or Bartholin's cyst.     Left Labia: No tenderness, lesions, skin changes, Bartholin's cyst or rash.     No labial fusion noted.      No inguinal adenopathy present in the right or left side.     Vaginal bleeding present.      No vaginal discharge, erythema, tenderness, ulceration or granulation tissue.      No vaginal prolapse present.     No vaginal atrophy present.       Right " Adnexa: not tender, not full, not palpable, no mass present and not absent.     Left Adnexa: not tender, not full, not palpable, no mass present and not absent.     No cervical motion tenderness, discharge, friability, lesion, polyp, nabothian cyst or eversion.      IUD strings visualized.      Uterus is not enlarged, fixed, tender, irregular or prolapsed.      No uterine mass detected.  Cardiovascular:      Rate and Rhythm: Normal rate.   Pulmonary:      Effort: Pulmonary effort is normal.   Abdominal:      Palpations: Abdomen is soft.      Hernia: There is no hernia in the left inguinal area or right inguinal area.   Musculoskeletal:      Cervical back: Normal range of motion.   Lymphadenopathy:      Lower Body: No right inguinal adenopathy. No left inguinal adenopathy.   Neurological:      Mental Status: She is alert.   Vitals and nursing note reviewed.       Type of IUD:  ParaGard  Date of insertion:  12/10/2020  Reason for removal:  device in lower uterine segment  Other relevant history/information:  none    Procedure Time Out Documentation      Procedure Details  IUD strings visible:  yes  Local anesthesia:  None  Tenaculum used:  None  Removal:  IUD strings grasped and IUD removed intact with gentle traction.  The patient tolerated the procedure well.    All appropriate instructions regarding removal were reviewed.    Tolerated well  No apparent complications  Post procedure diagnosis : IUD removal     Plans for contraception:  DMPA at this time, but desires to replace IUD    Other follow-up needed:  F/u when device is received    The patient was advised to call for any fever or for prolonged or severe pain or bleeding. She was advised to use NSAID as needed for mild to moderate pain.     Assessment/Plan    Diagnoses and all orders for this visit:    1. Pelvic pain (Primary)    2. Encounter for removal of intrauterine contraceptive device (IUD)    3. Encounter for surveillance of contraceptives, unspecified  contraceptive  -     medroxyPROGESTERone (Depo-Provera) 150 MG/ML injection; Inject 1 mL into the appropriate muscle as directed by prescriber Every 3 (Three) Months.  Dispense: 1 mL; Refill: 3    4. Vaginal discharge  -     NuSwab VG+ - Swab, Vagina    Reviewed with pt IUD has moved to lower uterine segment, requiring removal  She would like to start DMPA until IUD can be replaced. Desires paraguard IUD  Advised condoms first 2-3 weeks of DMPA use.  Vaginal cultures obtained  IUD removed without difficulty    Follow up: PRN and annually     I spent 30 minutes caring for Parveen on this date of service. This time includes time spent by me in the following activities: preparing for the visit, reviewing tests, obtaining and/or reviewing a separately obtained history, performing a medically appropriate examination and/or evaluation, counseling and educating the patient/family/caregiver, ordering medications, tests, or procedures, referring and communicating with other health care professionals and documenting information in the medical record    Luna Lopez, JUAN  12/9/2022  14:24 EST

## 2022-12-12 ENCOUNTER — CLINICAL SUPPORT (OUTPATIENT)
Dept: OBSTETRICS AND GYNECOLOGY | Facility: CLINIC | Age: 27
End: 2022-12-12

## 2022-12-12 VITALS
HEART RATE: 91 BPM | BODY MASS INDEX: 29.81 KG/M2 | DIASTOLIC BLOOD PRESSURE: 67 MMHG | HEIGHT: 62 IN | WEIGHT: 162 LBS | SYSTOLIC BLOOD PRESSURE: 108 MMHG

## 2022-12-12 DIAGNOSIS — Z30.013 INITIATION OF DEPO PROVERA: Primary | ICD-10-CM

## 2022-12-12 LAB
B-HCG UR QL: NEGATIVE
EXPIRATION DATE: NORMAL
INTERNAL NEGATIVE CONTROL: NEGATIVE
INTERNAL POSITIVE CONTROL: POSITIVE
Lab: NORMAL

## 2022-12-12 PROCEDURE — 96372 THER/PROPH/DIAG INJ SC/IM: CPT | Performed by: STUDENT IN AN ORGANIZED HEALTH CARE EDUCATION/TRAINING PROGRAM

## 2022-12-12 PROCEDURE — 81025 URINE PREGNANCY TEST: CPT | Performed by: STUDENT IN AN ORGANIZED HEALTH CARE EDUCATION/TRAINING PROGRAM

## 2022-12-12 RX ORDER — MEDROXYPROGESTERONE ACETATE 150 MG/ML
150 INJECTION, SUSPENSION INTRAMUSCULAR ONCE
Status: COMPLETED | OUTPATIENT
Start: 2022-12-12 | End: 2022-12-12

## 2022-12-12 RX ADMIN — MEDROXYPROGESTERONE ACETATE 150 MG: 150 INJECTION, SUSPENSION INTRAMUSCULAR at 15:37

## 2022-12-13 LAB
A VAGINAE DNA VAG QL NAA+PROBE: NORMAL SCORE
BVAB2 DNA VAG QL NAA+PROBE: NORMAL SCORE
C ALBICANS DNA VAG QL NAA+PROBE: NEGATIVE
C GLABRATA DNA VAG QL NAA+PROBE: NEGATIVE
C TRACH DNA VAG QL NAA+PROBE: NEGATIVE
MEGA1 DNA VAG QL NAA+PROBE: NORMAL SCORE
N GONORRHOEA DNA VAG QL NAA+PROBE: NEGATIVE
T VAGINALIS DNA VAG QL NAA+PROBE: NEGATIVE

## 2022-12-21 ENCOUNTER — TELEPHONE (OUTPATIENT)
Dept: OBSTETRICS AND GYNECOLOGY | Facility: CLINIC | Age: 27
End: 2022-12-21

## 2023-02-23 ENCOUNTER — OFFICE VISIT (OUTPATIENT)
Dept: OBSTETRICS AND GYNECOLOGY | Facility: CLINIC | Age: 28
End: 2023-02-23
Payer: COMMERCIAL

## 2023-02-23 VITALS
WEIGHT: 159 LBS | SYSTOLIC BLOOD PRESSURE: 110 MMHG | DIASTOLIC BLOOD PRESSURE: 71 MMHG | HEIGHT: 62 IN | BODY MASS INDEX: 29.26 KG/M2

## 2023-02-23 DIAGNOSIS — R10.2 PELVIC PAIN: Primary | ICD-10-CM

## 2023-02-23 LAB
B-HCG UR QL: NEGATIVE
BILIRUB BLD-MCNC: NEGATIVE MG/DL
EXPIRATION DATE: NORMAL
GLUCOSE UR STRIP-MCNC: NEGATIVE MG/DL
INTERNAL NEGATIVE CONTROL: NEGATIVE
INTERNAL POSITIVE CONTROL: POSITIVE
KETONES UR QL: NEGATIVE
LEUKOCYTE EST, POC: ABNORMAL
Lab: NORMAL
NITRITE UR-MCNC: NEGATIVE MG/ML
PH UR: 6.5 [PH] (ref 5–8)
PROT UR STRIP-MCNC: ABNORMAL MG/DL
RBC # UR STRIP: NEGATIVE /UL
SP GR UR: 1.02 (ref 1–1.03)
UROBILINOGEN UR QL: ABNORMAL

## 2023-02-23 PROCEDURE — 81025 URINE PREGNANCY TEST: CPT | Performed by: NURSE PRACTITIONER

## 2023-02-23 PROCEDURE — 99213 OFFICE O/P EST LOW 20 MIN: CPT | Performed by: NURSE PRACTITIONER

## 2023-02-23 NOTE — PROGRESS NOTES
"Chief Complaint   Patient presents with   • Follow-up     Pt states when having intercourse pain on left side of pelvic, pt states has had this pain since IUD was removed         SUBJECTIVE:     Parveen Hernandez is a 27 y.o.  who presents with LLQ pain with intercourse since IUD was removed in December. She is not having pain at this time. This is a new problem.   She was having pelvic pain in December as well, but states current pain is different. She denies pain with initial penetration. Feeling pain with deep penetration. Denies new partners. Had negative NuSwab+ December. Denies vaginal discharge, itching, odor, or dysuria.     C/o dizziness every day X1 month. She has upcoming appt with PCP to evaluate this, but would like to know if DMPA could be the source. She feels she is drinking appropriate amounts of water daily.     She is a patient of Dr. Dominguez.    Past Medical History:   Diagnosis Date   • Anxiety    • H/O cold sores    • Migraines       Past Surgical History:   Procedure Laterality Date   •  SECTION     • TONSILLECTOMY          Review of Systems   Constitutional: Negative for chills, fatigue and fever.   Gastrointestinal: Negative for abdominal distention and abdominal pain.   Genitourinary: Positive for dyspareunia and pelvic pain (LLQ ). Negative for dysuria, menstrual problem, vaginal bleeding, vaginal discharge and vaginal pain.       OBJECTIVE:   Vitals:    23 1054   BP: 110/71   Weight: 72.1 kg (159 lb)   Height: 157.5 cm (62\")        Physical Exam  Constitutional:       General: She is not in acute distress.     Appearance: Normal appearance. She is not ill-appearing, toxic-appearing or diaphoretic.   Genitourinary:      Bladder and urethral meatus normal.      No lesions in the vagina.      Right Labia: No rash, tenderness, lesions, skin changes or Bartholin's cyst.     Left Labia: No tenderness, lesions, skin changes, Bartholin's cyst or rash.     No labial fusion noted. "      No inguinal adenopathy present in the right or left side.     No vaginal discharge, erythema, tenderness, bleeding, ulceration or granulation tissue.      No vaginal prolapse present.     No vaginal atrophy present.       Right Adnexa: not tender, not full, not palpable, no mass present and not absent.     Left Adnexa: not tender, not full, not palpable, no mass present and not absent.     No cervical motion tenderness, discharge, friability, lesion, polyp, nabothian cyst or eversion.      Uterus is not enlarged, fixed, tender, irregular or prolapsed.      No uterine mass detected.  Cardiovascular:      Rate and Rhythm: Normal rate.   Pulmonary:      Effort: Pulmonary effort is normal.   Abdominal:      General: There is no distension.      Palpations: Abdomen is soft. There is no mass.      Tenderness: There is no abdominal tenderness. There is no guarding.      Hernia: No hernia is present. There is no hernia in the left inguinal area or right inguinal area.   Musculoskeletal:         General: Normal range of motion.      Cervical back: Normal range of motion.   Lymphadenopathy:      Lower Body: No right inguinal adenopathy. No left inguinal adenopathy.   Neurological:      General: No focal deficit present.      Mental Status: She is alert and oriented to person, place, and time.      Cranial Nerves: No cranial nerve deficit.   Skin:     General: Skin is warm and dry.   Psychiatric:         Mood and Affect: Mood normal.         Behavior: Behavior normal.         Thought Content: Thought content normal.         Judgment: Judgment normal.   Vitals and nursing note reviewed.       Brief Urine Lab Results  (Last result in the past 365 days)      Color   Clarity   Blood   Leuk Est   Nitrite   Protein   CREAT   Urine HCG        02/23/23 1109               Negative           Assessment/Plan    Diagnoses and all orders for this visit:    1. Pelvic pain (Primary)  -     Urine Culture - Urine, Urine, Clean Catch  -      POC Pregnancy, Urine  -     POC Urinalysis Dipstick    Pt had normal TVUS in December as well as negative vaginal cultures at that time. Urine sent for culture today  No pain with external palpation of RLQ or LLQ  Pt has pain elicited with gentle palpation of left obturator internus, attempted to release muscle hypertonicity with gentle pressure  Discussed deep breathing and relaxation techniques as well as kegels to improve symptoms  Discussed pelvic floor PT with on going issues. She desires to monitor this for 1-2 weeks and will call if she decides to start PT    Return in about 2 weeks (around 3/9/2023), or if symptoms worsen or fail to improve.    I spent 22 minutes caring for Parveen on this date of service. This time includes time spent by me in the following activities: preparing for the visit, reviewing tests, obtaining and/or reviewing a separately obtained history, performing a medically appropriate examination and/or evaluation, counseling and educating the patient/family/caregiver, ordering medications, tests, or procedures, referring and communicating with other health care professionals and documenting information in the medical record    Luna Lopez, APRN  2/23/2023  12:25 EST

## 2023-02-25 LAB
BACTERIA UR CULT: NORMAL
BACTERIA UR CULT: NORMAL

## 2023-03-10 ENCOUNTER — OFFICE VISIT (OUTPATIENT)
Dept: OBSTETRICS AND GYNECOLOGY | Facility: CLINIC | Age: 28
End: 2023-03-10
Payer: COMMERCIAL

## 2023-03-10 VITALS
BODY MASS INDEX: 30.18 KG/M2 | HEART RATE: 82 BPM | WEIGHT: 164 LBS | DIASTOLIC BLOOD PRESSURE: 71 MMHG | HEIGHT: 62 IN | SYSTOLIC BLOOD PRESSURE: 107 MMHG

## 2023-03-10 DIAGNOSIS — B37.31 CANDIDAL VULVOVAGINITIS: ICD-10-CM

## 2023-03-10 DIAGNOSIS — Z30.41 SURVEILLANCE FOR BIRTH CONTROL, ORAL CONTRACEPTIVES: ICD-10-CM

## 2023-03-10 DIAGNOSIS — N89.8 VAGINAL DISCHARGE: Primary | ICD-10-CM

## 2023-03-10 PROCEDURE — 1159F MED LIST DOCD IN RCRD: CPT | Performed by: OBSTETRICS & GYNECOLOGY

## 2023-03-10 PROCEDURE — 1160F RVW MEDS BY RX/DR IN RCRD: CPT | Performed by: OBSTETRICS & GYNECOLOGY

## 2023-03-10 PROCEDURE — 99213 OFFICE O/P EST LOW 20 MIN: CPT | Performed by: OBSTETRICS & GYNECOLOGY

## 2023-03-10 RX ORDER — CLOTRIMAZOLE AND BETAMETHASONE DIPROPIONATE 10; .64 MG/G; MG/G
1 CREAM TOPICAL 2 TIMES DAILY
Qty: 15 G | Refills: 0 | Status: SHIPPED | OUTPATIENT
Start: 2023-03-10 | End: 2023-03-13

## 2023-03-10 RX ORDER — FLUCONAZOLE 150 MG/1
150 TABLET ORAL
Qty: 2 TABLET | Refills: 0 | Status: SHIPPED | OUTPATIENT
Start: 2023-03-10 | End: 2023-03-14

## 2023-03-10 RX ORDER — NORGESTIMATE AND ETHINYL ESTRADIOL 0.25-0.035
1 KIT ORAL DAILY
Qty: 28 TABLET | Refills: 11 | Status: SHIPPED | OUTPATIENT
Start: 2023-03-10

## 2023-03-10 NOTE — PROGRESS NOTES
"Chief Complaint  Gynecologic Exam (Patient is here today with vaginal itching and discharge- white discharge, no smell. Last pap 08/26/2022- neg )  Patient also wants to switch her birth control from Depo-Provera to birth control pills.  Subjective        Parveen Hernandez presents to [facility]  History of Present Illness  Patient reports a 2-3-day history of vaginal discharge that is thick, white, and non-malodorous.  She also reports internal vaginal and external vulvar itching.  She reports that she tried over-the-counter Monistat treatment with little relief.  She denies any irregular bleeding.  She denies recent antibiotic use.  She denies the use of harsh soaps.  She does not currently use probiotics.    Patient is also interested in starting on birth control pills.  She had 1 Depo-Provera injection about 11 weeks ago but says that she is gaining weight and she is unhappy with this.  She is also previously used Nexplanon and IUD.  She is interested in birth control.    The following portions of the patient's history were reviewed and updated as appropriate: allergies, current medications, past family history, past medical history, past social history, past surgical history, and problem list.    Objective   Vital Signs:  /71   Pulse 82   Ht 157.5 cm (62\")   Wt 74.4 kg (164 lb)   BMI 30.00 kg/m²   Estimated body mass index is 30 kg/m² as calculated from the following:    Height as of this encounter: 157.5 cm (62\").    Weight as of this encounter: 74.4 kg (164 lb).             Physical Exam   Gen: No acute distress, awake and oriented times three  Abdomen: soft, nontender, no masses or hernia, no hepatosplenomegaly, non distended, normoactive bowel sounds  Pelvic: Exam performed in the presence of a female chaperone  Patient has provided verbal consent to proceed with exam.  Normal external female genitalia, no lesions, there is mild labial edema and erythema " bilaterally.  There is a copious amount of thick white discharge noted  Urethra: Normal meatus, no caruncle  Bladder: nontender  Vagina: No blood ; moderater amount thick white DC  Bimanual exam   External anal exam: Normal appearance, no lesions or hemorrhoids  Rectal: Deferred  Psych: Good judgement and insight, normal affect and mood      Result Review :                   Assessment and Plan   Diagnoses and all orders for this visit:    1. Vaginal discharge (Primary)  -     NuSwab VG+ - Swab, Vagina    2. Candidal vulvovaginitis  -     fluconazole (DIFLUCAN) 150 MG tablet; Take 1 tablet by mouth Every 3 (Three) Days for 2 doses.  Dispense: 2 tablet; Refill: 0    Symptoms and exam findings c/w candida. Will treat empirically with fluconazole. Also recommend probiotics. Avoid vaginal/vulvar irritants. Will send cultures for confirmation. Followup as needed.    3. Surveillance for birth control, oral contraceptives  -     norgestimate-ethinyl estradiol (Sprintec 28) 0.25-35 MG-MCG per tablet; Take 1 tablet by mouth Daily.  Dispense: 28 tablet; Refill: 11    Various contraceptive options discussed including natural family planning and abstinence. Paitent elects for oral contraceptive pills. Risks, benefits, alternatives discussed at length. Risks of venous thromboembolic complications discussed. Instructions for use and Sunday start discussed. Discussed condoms in first month for backup method. Also discussed condoms for STD prevention. All questions answered. Rx for sprintec given.              Follow Up   Return if symptoms worsen or fail to improve.  Patient was given instructions and counseling regarding her condition or for health maintenance advice. Please see specific information pulled into the AVS if appropriate.

## 2023-03-13 ENCOUNTER — TELEPHONE (OUTPATIENT)
Dept: OBSTETRICS AND GYNECOLOGY | Facility: CLINIC | Age: 28
End: 2023-03-13
Payer: COMMERCIAL

## 2023-03-13 NOTE — TELEPHONE ENCOUNTER
HUB call. Seen 3/10/23 vaginal discharge, candidal vulvovaginitis. Results of vag swab not back 3/13/23 @9:15 am. [facility] pharmacy Thank you.

## 2023-03-13 NOTE — TELEPHONE ENCOUNTER
Spoke with Parveen to let her know that Dr Crawford would like to await the swab results. If you can just wait another day or 2 they should be back soon. Thank you.

## 2023-03-13 NOTE — TELEPHONE ENCOUNTER
I would like to await the swab results.  If she can just wait another day or 2 they should be back soon.

## 2023-03-13 NOTE — TELEPHONE ENCOUNTER
Caller: Hernandez, Gilrod    Relationship: Self    Best call back number: [phone]  What medications are you currently taking:   Current Outpatient Medications on File Prior to Visit   Medication Sig Dispense Refill   • cetirizine (zyrTEC) 10 MG tablet Take 1 tablet by mouth.     • clotrimazole-betamethasone (Lotrisone) 1-0.05 % cream Apply 1 application topically to the appropriate area as directed 2 (Two) Times a Day for 3 days. 15 g 0   • escitalopram (LEXAPRO) 10 MG tablet Take 1 tablet by mouth Daily.     • famotidine (PEPCID) 20 MG tablet Take 1 tablet by mouth 2 (Two) Times a Day.     • fluconazole (DIFLUCAN) 150 MG tablet Take 1 tablet by mouth Every 3 (Three) Days for 2 doses. 2 tablet 0   • norgestimate-ethinyl estradiol (Sprintec 28) 0.25-35 MG-MCG per tablet Take 1 tablet by mouth Daily. 28 tablet 11   • topiramate (TOPAMAX) 50 MG tablet TAKE 1 TABLET BY MOUTH EVERY NIGHT AS DIRECTED       No current facility-administered medications on file prior to visit.          When did you start taking these medications: 03/10    Which medication are you concerned about: clotrimazole-betamethasone (Lotrisone) 1-0.05 % cream [00980] (Order [id number])    AND   fluconazole (DIFLUCAN) 150 MG tablet [[id number]] (Order [id number])        Who prescribed you this medication: DR. DELGADO     What are your concerns: PT WAS SEEN ON 03/10 AND HAS BEEN TAKING MEDICATIONS AS PRESCRIBED, STATES SHE IS STILL HAVING THE SAME SYMPTOMS AND EVEN FEELS SYMPTOMS ARE WORSE     How long have you had these concerns: 03/10    PT WOULD LIKE TO SEE IF DR. DELGADO WOULD EITHER LIKE TO SEE PT AGAIN OR IF SHE CAN PRESCRIBED OTHER MEDICATIONS     OK TO CALLBACK ANYTIME, OK TO LEAVE A VM

## 2023-03-14 LAB
A VAGINAE DNA VAG QL NAA+PROBE: ABNORMAL SCORE
BVAB2 DNA VAG QL NAA+PROBE: ABNORMAL SCORE
C ALBICANS DNA VAG QL NAA+PROBE: POSITIVE
C GLABRATA DNA VAG QL NAA+PROBE: NEGATIVE
C TRACH DNA VAG QL NAA+PROBE: NEGATIVE
MEGA1 DNA VAG QL NAA+PROBE: ABNORMAL SCORE
N GONORRHOEA DNA VAG QL NAA+PROBE: NEGATIVE
T VAGINALIS DNA VAG QL NAA+PROBE: NEGATIVE

## 2023-03-15 ENCOUNTER — TELEPHONE (OUTPATIENT)
Dept: OBSTETRICS AND GYNECOLOGY | Facility: CLINIC | Age: 28
End: 2023-03-15
Payer: COMMERCIAL

## 2023-03-15 DIAGNOSIS — B37.31 CANDIDAL VULVOVAGINITIS: Primary | ICD-10-CM

## 2023-03-16 NOTE — TELEPHONE ENCOUNTER
Pt called to report she is still having symptoms of yeast infection.  Would like another round of medication if possible.      Please advise.     Pt # [phone]

## 2023-07-11 ENCOUNTER — OFFICE VISIT (OUTPATIENT)
Dept: OBSTETRICS AND GYNECOLOGY | Facility: CLINIC | Age: 28
End: 2023-07-11
Payer: COMMERCIAL

## 2023-07-11 VITALS
SYSTOLIC BLOOD PRESSURE: 111 MMHG | DIASTOLIC BLOOD PRESSURE: 77 MMHG | WEIGHT: 170 LBS | HEIGHT: 62 IN | BODY MASS INDEX: 31.28 KG/M2

## 2023-07-11 DIAGNOSIS — O36.80X0 PREGNANCY WITH INCONCLUSIVE FETAL VIABILITY, SINGLE OR UNSPECIFIED FETUS: Primary | ICD-10-CM

## 2023-07-11 DIAGNOSIS — B37.9 YEAST INFECTION: ICD-10-CM

## 2023-07-11 RX ORDER — FLUCONAZOLE 150 MG/1
150 TABLET ORAL
Qty: 2 TABLET | Refills: 0 | Status: SHIPPED | OUTPATIENT
Start: 2023-07-11

## 2023-07-11 NOTE — PROGRESS NOTES
"Chief Complaint   Patient presents with    Follow-up     Possible MAB        SUBJECTIVE:     Parveen Hernandez is a 28 y.o.  who presents for follow up of vaginal bleeding in early pregnancy. She reports that her last menstrual period was on 23 and she had a positive home pregnancy test last 23 after having 3 days of vaginal spotting with cramping and back pain earlier this week. She then started experiencing dark brown vaginal bleeding that became bright red yesterday so she went to the ED at [facility]. There she was told she had an early pregnancy but needed to follow up in 1-2 weeks to check in on it. She had been there overnight and presented here this morning. She reports no further vaginal bleeding today. She is having constant lower abdominal pain. She also reports having a yeast infection for which she took Monistat last week without improvement. She is still having vaginal irritation and skin itching around her vagina.    Past Medical History:   Diagnosis Date    Anxiety     H/O cold sores     Migraines       Past Surgical History:   Procedure Laterality Date     SECTION      TONSILLECTOMY        Social History     Tobacco Use    Smoking status: Never    Smokeless tobacco: Never   Substance Use Topics    Alcohol use: Yes     Comment: social    Drug use: Never     OB History    Para Term  AB Living   2 1 1     1   SAB IAB Ectopic Molar Multiple Live Births             1      # Outcome Date GA Lbr Bello/2nd Weight Sex Delivery Anes PTL Lv   2 Current            1 Term 10/04/16 40w0d  3175 g (7 lb) F CS-LTranv   OSCAR        Review of Systems   Gastrointestinal:  Positive for abdominal pain.   Genitourinary:  Positive for vaginal bleeding and vaginal discharge.     OBJECTIVE:   Vitals:    23 0920   BP: 111/77   Weight: 77.1 kg (170 lb)   Height: 157.5 cm (62.01\")        Physical Exam  Vitals reviewed.   Constitutional:       General: She is not in acute distress.  HENT: "      Head: Normocephalic and atraumatic.      Right Ear: External ear normal.      Left Ear: External ear normal.   Eyes:      Extraocular Movements: Extraocular movements intact.      Pupils: Pupils are equal, round, and reactive to light.   Pulmonary:      Effort: Pulmonary effort is normal. No respiratory distress.   Abdominal:      General: There is no distension.      Palpations: Abdomen is soft.      Tenderness: There is no abdominal tenderness. There is no guarding or rebound.   Musculoskeletal:         General: No deformity. Normal range of motion.      Cervical back: Normal range of motion and neck supple.   Skin:     General: Skin is warm and dry.   Neurological:      General: No focal deficit present.      Mental Status: She is alert and oriented to person, place, and time.   Psychiatric:         Mood and Affect: Mood normal.         Behavior: Behavior normal.       ASSESSMENT:     ICD-10-CM ICD-9-CM   1. Pregnancy with inconclusive fetal viability, single or unspecified fetus  O36.80X0 V23.87   2. Yeast infection  B37.9 112.9       PLAN:   I reviewed the patient's records at [facility] and discussed her ultrasound results that noted an intrauterine gestation with CRL measuring 6w0d (0.3 cm) without fetal heart tones present. I discussed with the patient that her ultrasound does not meet criteria for a failed pregnancy and it is likely just very early in gestation. Since this is hopefully the case, will have the patient return for follow up ultrasound in 1 week for fetal viability and if fetus is viable, will see for new OB visit. If fetus is not viable, will discuss next steps based on ultrasound results. I discussed that vaginal bleeding in early pregnancy can be normal and reviewed concerning signs for miscarriage. Upon review of records, her blood type is A positive so Rhogam is not indicated. Patient indicated understanding and all questions answered.   Prescribed Diflucan 150 mg Q 3 days x 2 doses PO  for management of yeast infection that did not improve with Monistat treatment.   Return in about 1 week (around 7/18/2023) for F/u pregnancy of unknown viability and ultrasound  .    Estela Bo MD

## 2023-07-18 ENCOUNTER — INITIAL PRENATAL (OUTPATIENT)
Dept: OBSTETRICS AND GYNECOLOGY | Facility: CLINIC | Age: 28
End: 2023-07-18
Payer: COMMERCIAL

## 2023-07-18 VITALS — WEIGHT: 172 LBS | BODY MASS INDEX: 31.45 KG/M2 | SYSTOLIC BLOOD PRESSURE: 106 MMHG | DIASTOLIC BLOOD PRESSURE: 71 MMHG

## 2023-07-18 DIAGNOSIS — E66.9 OBESITY (BMI 30-39.9): ICD-10-CM

## 2023-07-18 DIAGNOSIS — O20.9 VAGINAL BLEEDING AFFECTING EARLY PREGNANCY: ICD-10-CM

## 2023-07-18 DIAGNOSIS — Z34.91 PRENATAL CARE IN FIRST TRIMESTER: ICD-10-CM

## 2023-07-18 DIAGNOSIS — Z3A.01 LESS THAN 8 WEEKS GESTATION OF PREGNANCY: Primary | ICD-10-CM

## 2023-07-18 DIAGNOSIS — Z98.891 HISTORY OF C-SECTION: ICD-10-CM

## 2023-07-18 RX ORDER — PNV NO.95/FERROUS FUM/FOLIC AC 28MG-0.8MG
1 TABLET ORAL DAILY
Qty: 30 TABLET | Refills: 11 | Status: SHIPPED | OUTPATIENT
Start: 2023-07-18

## 2023-07-18 NOTE — PROGRESS NOTES
Initial OB Visit    Chief Complaint   Patient presents with    Initial Prenatal Visit        Parveen Hernandez is being seen today for her first obstetrical visit.  She is a 28 y.o.  at 5w6d by LMP 23 consistent with 5w6d ultrasound.      This is not a planned pregnancy.   OB History    Para Term  AB Living   2 1 1     1   SAB IAB Ectopic Molar Multiple Live Births             1      # Outcome Date GA Lbr Bello/2nd Weight Sex Delivery Anes PTL Lv   2 Current            1 Term 10/04/16 40w0d  3175 g (7 lb) F CS-LTranv   OSCAR       Current obstetric complaints: She reports that she had a tiny clot on her underwear over the weekend but no further vaginal bleeding. She has intermittent abdominal cramping. Denies nausea or vomiting.   Prior obstetric issues, potential pregnancy concerns:     G1- C/S at 40w0d- heart rate went down and meconium    G2- current  Family history of genetic issues (includes FOB): denies   Prior infections concerning in pregnancy (Rash, fever since LMP): denies   Varicella Hx: She has been vaccinated.   Prior genetic testing: n/a  History of abnormal pap smears: denies; last pap smear: - NILM   History of STIs: denies History of HSV in self or partner? Denies   Prepregnancy weight 172 lb     Past Medical History:   Diagnosis Date    Anxiety     H/O cold sores     Migraines        Past Surgical History:   Procedure Laterality Date     SECTION      TONSILLECTOMY           Current Outpatient Medications:     cetirizine (zyrTEC) 10 MG tablet, Take 1 tablet by mouth., Disp: , Rfl:     escitalopram (LEXAPRO) 10 MG tablet, Take 1 tablet by mouth Daily., Disp: , Rfl:     famotidine (PEPCID) 20 MG tablet, Take 1 tablet by mouth 2 (Two) Times a Day., Disp: , Rfl:     fluconazole (Diflucan) 150 MG tablet, Take 1 tablet by mouth Every 3 (Three) Days., Disp: 2 tablet, Rfl: 0    Prenatal Vit-Fe Fumarate-FA (Prenatal Vitamin) 27-0.8 MG tablet, Take 1 tablet by mouth Daily.,  Disp: 30 tablet, Rfl: 11    topiramate (TOPAMAX) 50 MG tablet, TAKE 1 TABLET BY MOUTH EVERY NIGHT AS DIRECTED, Disp: , Rfl:     No Known Allergies    Social History     Socioeconomic History    Marital status: Single   Tobacco Use    Smoking status: Never    Smokeless tobacco: Never   Substance and Sexual Activity    Alcohol use: Yes     Comment: social    Drug use: Never    Sexual activity: Yes     Partners: Male     Birth control/protection: Depo-provera       Family History   Problem Relation Age of Onset    Diabetes Maternal Grandfather        Review of systems     Constitutional: negative for chills, fevers and for fatigue  Eyes: negative  Ears, nose, mouth, throat, and face: negative for hearing loss and nasal congestion  Respiratory: negative for asthma and wheezing  Cardiovascular: negative for chest pain and dyspnea  Gastrointestinal: negative for dyspepsia, dysphagia abdominal pain  Genitourinary:negative for urinary incontinence  Integument/breast: negative for breast lump  Hematologic/lymphatic: Positive for bleeding  Musculoskeletal:negative for aches  Neurological: negative for numbness/tingling  Behavioral/Psych: negative for anhedonia  Allergic/Immunologic: negative for rash, allergy    Objective    /71   Wt 78 kg (172 lb)   LMP 06/07/2023   BMI 31.45 kg/mý       General Appearance:    Alert, cooperative, in no acute distress   Head:    Normocephalic, without obvious abnormality, atraumatic   Eyes:            Lids and lashes normal, conjunctivae and sclerae normal, no   icterus, no pallor, corneas clear   Ears:    Ears appear intact with no abnormalities noted   Neck:   No adenopathy, supple, trachea midline, no thyromegaly   Back:     No kyphosis present, no scoliosis present,                       Lungs:     No increased work of breathing, regular resprations     Heart:    Normal rate    Breast Exam:    No masses, No nipple discharge   Abdomen:     No masses, no organomegaly, soft ,  non-tender, non-distended, no guarding, no rebound tenderness   Genitalia:    Vulva - BUS-WNL, NEFG    Vagina - No discharge, No bleeding    Cervix - No Lesions, closed     Uterus - Consistent with 6 weeks    Adnexa - No masses, NT    Pelvimetry - clinically adequate, gynecoid pelvis     Extremities:   Moves all extremities well, no edema, no cyanosis, no         redness   Pulses:   Pulses palpable and equal bilaterally   Skin:   No bleeding, bruising or rash   Lymph nodes:   No palpable adenopathy   Neurologic:   Sensation intact, A&O times 3      Assessment  Pregnancy at 5w6d by LMP consistent with 5 week ultrasound   2.   Prenatal care in first trimester   3.   History of C/S x 1   4.   Obesity- BMI 31   5.   Vaginal bleeding in early pregnancy      Plan    Collected NuSwab VG+ for prenatal labs and because of vagianl bleeding in early pregnancy. Patient will need blood work performed at next visit along with urine culture and urine drug screen.   Viability ultrasound performed today that demonstrated a 5w6d fetus measuring 5w5d by CRL with fetal cardiac pulsation visualized and a small chorionic bump measuring 0.5 x 0.5 cm. This ultrasound is consistent with LMP dating of 5w6d with GILBERTO 3/13/24.   Patient is on Prenatal vitamins  Problem list reviewed and updated.  Reviewed routine prenatal care with the office to include but not limited to:   Zika (travel restrictions/ok to use insect repellant), not to changing cat litter, food restrictions, avoidance of alcohol, tobacco and drugs and saunas/hot tubs, anticipated weight gain/nutrition requirements.  Reviewed nature of practice and hospital.  Reviewed recommended follow up, importance of compliance with care. We reviewed testing in pregnancy including HIV testing and urine drug screen.    Reviewed aneuploidy screening and CF/SMA screening.  We reviewed limitations of testing, possibility of false positive/negative results, possible need for other tests as  indicated. Will offer at next visit.   Counseled on limitations of ultrasound in pregnancy in detecting aneuploidy/fetal anomalies.     We reviewed that at this time, her BMI is classified as BMI 30.0-34.9        Classification: class I obesity.  We reviewed that in pregnancy, her recommended weight gain is 11-20 lb .  In the first trimester, caloric demand is typically not increased.  In the second and third trimester, the increased demand is approximately 350 and 450 calories respectively.    All questions answered.   Return in about 4 weeks (around 8/15/2023) for Next scheduled follow up.      Estela Bo MD

## 2023-08-30 DIAGNOSIS — Z3A.12 12 WEEKS GESTATION OF PREGNANCY: Primary | ICD-10-CM

## 2023-08-30 LAB — HCG INTACT+B SERPL-ACNC: 72.7 MIU/ML

## 2023-08-31 ENCOUNTER — TELEPHONE (OUTPATIENT)
Dept: OBSTETRICS AND GYNECOLOGY | Facility: CLINIC | Age: 28
End: 2023-08-31
Payer: COMMERCIAL

## 2023-08-31 NOTE — TELEPHONE ENCOUNTER
I called Parveen Hernandez to review hcg quant results. She has recently had MAB. Was seen in ED for vaginal bleeding. Hcg was 105 8/29/23. She had repeat hcg quant yesterday was 70. She has f/u with me Tuesday. Discussed following hcg to zero.    Luna Lopez, APRN  8/31/23  3:06pm

## 2023-09-05 ENCOUNTER — OFFICE VISIT (OUTPATIENT)
Dept: OBSTETRICS AND GYNECOLOGY | Facility: CLINIC | Age: 28
End: 2023-09-05
Payer: COMMERCIAL

## 2023-09-05 VITALS
DIASTOLIC BLOOD PRESSURE: 72 MMHG | HEIGHT: 62 IN | WEIGHT: 169.5 LBS | BODY MASS INDEX: 31.19 KG/M2 | SYSTOLIC BLOOD PRESSURE: 108 MMHG

## 2023-09-05 DIAGNOSIS — Z30.9 ENCOUNTER FOR CONTRACEPTIVE MANAGEMENT, UNSPECIFIED TYPE: ICD-10-CM

## 2023-09-05 DIAGNOSIS — O03.9 MISCARRIAGE: Primary | ICD-10-CM

## 2023-09-05 RX ORDER — CEFDINIR 300 MG/1
300 CAPSULE ORAL
COMMUNITY
Start: 2023-08-29 | End: 2023-09-05

## 2023-09-05 RX ORDER — MEDROXYPROGESTERONE ACETATE 150 MG/ML
150 INJECTION, SUSPENSION INTRAMUSCULAR
Qty: 1 EACH | Refills: 0 | Status: SHIPPED | OUTPATIENT
Start: 2023-09-05

## 2023-09-05 NOTE — PROGRESS NOTES
"Chief Complaint   Patient presents with    Contraception     Patient is here today to have her HCG levels checked following a MC. Patient would also like to discuss BC options.         SUBJECTIVE:     Parveen Hernandez is a 28 y.o.  who presents to f/u recent [facility]. She is having repeat hcg today. She is still having vaginal bleeding at this time, bleeding is light. She would like to discuss contraceptive options. Denies history of migraine with aura, denies history of DVT, there is no family history of DVT. She is a nonsmoker. She has used DMPA, nexplanon, IUD, and pills in the past. She is interested in Paraguard IUD, which she has used in the past, however this device did fall out of place.  Past Medical History:   Diagnosis Date    Anxiety     H/O cold sores     Migraines       Past Surgical History:   Procedure Laterality Date     SECTION      TONSILLECTOMY          Review of Systems   Gastrointestinal:  Negative for abdominal distention and abdominal pain.   Genitourinary:  Positive for vaginal bleeding. Negative for dysuria, pelvic pain, vaginal discharge and vaginal pain.     OBJECTIVE:   Vitals:    23 1109   BP: 108/72   Weight: 76.9 kg (169 lb 8 oz)   Height: 157.5 cm (62.01\")        Physical Exam  Constitutional:       General: She is not in acute distress.     Appearance: Normal appearance. She is not ill-appearing, toxic-appearing or diaphoretic.   Cardiovascular:      Rate and Rhythm: Normal rate.   Pulmonary:      Effort: Pulmonary effort is normal.   Abdominal:      General: There is no distension.      Palpations: Abdomen is soft. There is no mass.      Tenderness: There is no abdominal tenderness. There is no guarding.      Hernia: No hernia is present.   Musculoskeletal:         General: Normal range of motion.      Cervical back: Normal range of motion.   Neurological:      General: No focal deficit present.      Mental Status: She is alert and oriented to person, place, and time. "      Cranial Nerves: No cranial nerve deficit.   Skin:     General: Skin is warm and dry.   Psychiatric:         Mood and Affect: Mood normal.         Behavior: Behavior normal.         Thought Content: Thought content normal.         Judgment: Judgment normal.   Vitals and nursing note reviewed.       Assessment/Plan    Diagnoses and all orders for this visit:    1. Miscarriage (Primary)  -     HCG, B-subunit, Quantitative    2. Encounter for contraceptive management, unspecified type  -     medroxyPROGESTERone (Depo-Provera) 150 MG/ML injection; Inject 1 mL into the appropriate muscle as directed by prescriber Every 3 (Three) Months.  Dispense: 1 each; Refill: 0    Repeat Hcg today  Discussed contraception options at length including pills, patch, vaginal ring, POPs,  injection, implant, and IUDs.  The risks and benefits of the methods were discussed including but not limited to the increased risk of heart attack, blood clot, and stroke.  It was discussed the contraception does not protect against sexually transmitted infections and condoms are encouraged. The patient desires to Replace paraguard IUD. Discussed start up, uses, side effects, risks vs benefits. Discussed risks of IUD falling out of place again. She would like to proceed  She is interested in starting DMPA now while she is waiting for IUD approval from insurance and appointment for placement.   Encouraged condoms for the first 1-2 weeks of use as back up.     Return if symptoms worsen or fail to improve.    I spent 22 minutes caring for Parveen on this date of service. This time includes time spent by me in the following activities: preparing for the visit, reviewing tests, obtaining and/or reviewing a separately obtained history, performing a medically appropriate examination and/or evaluation, counseling and educating the patient/family/caregiver, ordering medications, tests, or procedures, referring and communicating with other health care  professionals, and documenting information in the medical record      Luna Lopez, APRN  9/5/2023  12:27 EDT

## 2023-09-06 ENCOUNTER — TELEPHONE (OUTPATIENT)
Dept: OBSTETRICS AND GYNECOLOGY | Facility: CLINIC | Age: 28
End: 2023-09-06
Payer: COMMERCIAL

## 2023-09-06 DIAGNOSIS — O03.9 SAB (SPONTANEOUS ABORTION): Primary | ICD-10-CM

## 2023-09-06 LAB — HCG INTACT+B SERPL-ACNC: 70.6 MIU/ML

## 2023-09-06 NOTE — TELEPHONE ENCOUNTER
I called Parveen Hernandez to discuss hcg results. Notified hcg only dropped from 72 to 70. Discussed importance of following to zero before starting DMPA. She will return to office in one week for repeat hcg.     Luna Lopez, APRN  9/6/23  2:23pm

## 2023-09-13 DIAGNOSIS — O03.9 SAB (SPONTANEOUS ABORTION): Primary | ICD-10-CM

## 2023-09-20 DIAGNOSIS — O03.9 SAB (SPONTANEOUS ABORTION): Primary | ICD-10-CM

## 2023-09-28 LAB — HCG INTACT+B SERPL-ACNC: 9.01 MIU/ML

## 2023-09-29 DIAGNOSIS — O03.9 MISCARRIAGE: Primary | ICD-10-CM

## 2023-10-02 DIAGNOSIS — O03.9 SAB (SPONTANEOUS ABORTION): Primary | ICD-10-CM

## 2023-10-04 LAB — HCG INTACT+B SERPL-ACNC: 1.48 MIU/ML

## 2023-10-05 ENCOUNTER — TELEPHONE (OUTPATIENT)
Dept: OBSTETRICS AND GYNECOLOGY | Facility: CLINIC | Age: 28
End: 2023-10-05
Payer: COMMERCIAL

## 2023-10-05 NOTE — TELEPHONE ENCOUNTER
----- Message from JUAN Steiner sent at 10/5/2023  8:41 AM EDT -----  Please let the pt know that  her recent u/s was normal. Additionally her most recent Hcg is at 1.48. she does not have to return for further hcg testing. She was interested in starting DMPA, please help her with scheduling. The medication was sent to   her pharmacy at her last visit. Thank you

## 2023-10-09 ENCOUNTER — CLINICAL SUPPORT (OUTPATIENT)
Dept: OBSTETRICS AND GYNECOLOGY | Facility: CLINIC | Age: 28
End: 2023-10-09
Payer: COMMERCIAL

## 2023-10-09 DIAGNOSIS — Z30.42 ENCOUNTER FOR MANAGEMENT AND INJECTION OF DEPO-PROVERA: ICD-10-CM

## 2023-10-09 DIAGNOSIS — Z30.9 ENCOUNTER FOR CONTRACEPTIVE MANAGEMENT, UNSPECIFIED TYPE: Primary | ICD-10-CM

## 2023-10-12 ENCOUNTER — TELEPHONE (OUTPATIENT)
Dept: OBSTETRICS AND GYNECOLOGY | Facility: CLINIC | Age: 28
End: 2023-10-12
Payer: COMMERCIAL

## 2023-10-12 DIAGNOSIS — Z30.9 ENCOUNTER FOR CONTRACEPTIVE MANAGEMENT, UNSPECIFIED TYPE: ICD-10-CM

## 2023-10-12 RX ORDER — MEDROXYPROGESTERONE ACETATE 150 MG/ML
150 INJECTION, SUSPENSION INTRAMUSCULAR
Qty: 1 EACH | Refills: 0 | Status: SHIPPED | OUTPATIENT
Start: 2023-10-12

## 2023-10-12 NOTE — TELEPHONE ENCOUNTER
Caller: Parveen Hernandez    Relationship: Self    Best call back number: [phone]    Requested Prescriptions:   Requested Prescriptions     Pending Prescriptions Disp Refills    medroxyPROGESTERone (Depo-Provera) 150 MG/ML injection 1 each 0     Sig: Inject 1 mL into the appropriate muscle as directed by prescriber Every 3 (Three) Months.        Pharmacy where request should be sent:      Last office visit with prescribing clinician: 2023     Additional details provided by patient: PATIENT TRIED TO GET YOUR DEPO SHOT AND THEY TOLD HER THAT SHE HAD TO GET A NEW PRESCRIPTION FROM HER DOCTOR BECAUSE THE SHOT SHE HAD  SINCE SHE WAITED SO LONG. DOES SHE NEED APPT OR JUST A PRESCRIPTION SENT IN? PLEASE CALL PATIENT TO ADVISE.    Does the patient have less than a 3 day supply:  [x] Yes  [] No    Would you like a call back once the refill request has been completed: [x] Yes [] No    If the office needs to give you a call back, can they leave a voicemail: [x] Yes [] No    Brooke Ramirez Rep   10/12/23 11:05 EDT

## 2023-12-07 ENCOUNTER — OFFICE VISIT (OUTPATIENT)
Dept: OBSTETRICS AND GYNECOLOGY | Facility: CLINIC | Age: 28
End: 2023-12-07
Payer: COMMERCIAL

## 2023-12-07 VITALS
SYSTOLIC BLOOD PRESSURE: 124 MMHG | BODY MASS INDEX: 30.36 KG/M2 | WEIGHT: 165 LBS | HEIGHT: 62 IN | DIASTOLIC BLOOD PRESSURE: 69 MMHG

## 2023-12-07 DIAGNOSIS — Z30.09 ENCOUNTER FOR OTHER GENERAL COUNSELING OR ADVICE ON CONTRACEPTION: Primary | ICD-10-CM

## 2023-12-07 NOTE — Clinical Note
Parveen Ogden would like paraguard IUD. She should be scheduled with Dr Bo for placement. Thank you

## 2023-12-07 NOTE — PROGRESS NOTES
"Chief Complaint   Patient presents with    Follow-up     Patient is here today to discuss IUD.       SUBJECTIVE:     Parveen Hernandez is a 28 y.o.  who presents to discuss contraceptive options. She is interested in paraguard IUD which she has used in the past. She was happy with device, this was removed do to malposition. IUD was found in lower uterine segment and removed. She was prescribed DMPA, but did not this. She had SAB 2023. She denies any problems or concerns today She is a patient of Dr. Dominguez.  LMP 2023    Past Medical History:   Diagnosis Date    Anxiety     H/O cold sores     Migraines       Past Surgical History:   Procedure Laterality Date     SECTION      TONSILLECTOMY          Review of Systems   Constitutional:  Negative for chills, fatigue and fever.   Gastrointestinal:  Negative for abdominal distention and abdominal pain.   Genitourinary:  Negative for dysuria, menstrual problem, pelvic pain, vaginal bleeding, vaginal discharge and vaginal pain.       OBJECTIVE:   Vitals:    23 1003   BP: 124/69   Weight: 74.8 kg (165 lb)   Height: 157.5 cm (62.01\")        Physical Exam  Constitutional:       General: She is not in acute distress.     Appearance: Normal appearance. She is not ill-appearing, toxic-appearing or diaphoretic.   Cardiovascular:      Rate and Rhythm: Normal rate.   Pulmonary:      Effort: Pulmonary effort is normal.   Musculoskeletal:         General: Normal range of motion.      Cervical back: Normal range of motion.   Neurological:      General: No focal deficit present.      Mental Status: She is alert and oriented to person, place, and time.      Cranial Nerves: No cranial nerve deficit.   Skin:     General: Skin is warm and dry.   Psychiatric:         Mood and Affect: Mood normal.         Behavior: Behavior normal.         Thought Content: Thought content normal.         Judgment: Judgment normal.   Vitals and nursing note reviewed. "       Assessment/Plan    Diagnoses and all orders for this visit:    1. Encounter for other general counseling or advice on contraception (Primary)    Discussed paraguard IUD at length. She has used in the past and was happy with device  Reviewed risks vs benefits and placement procedures.   She will f/u with Dr. Bo for placement.    Return if symptoms worsen or fail to improve.    I spent 21 minutes caring for Parveen on this date of service. This time includes time spent by me in the following activities: preparing for the visit, reviewing tests, obtaining and/or reviewing a separately obtained history, performing a medically appropriate examination and/or evaluation, counseling and educating the patient/family/caregiver, ordering medications, tests, or procedures, referring and communicating with other health care professionals, and documenting information in the medical record    Luna Lopez, APRN  12/7/2023  10:15 EST

## 2023-12-07 NOTE — Clinical Note
Nel, this pt will be f/u with Dr. Bo for paraguard IUD placement. Melvi is ordering. She had paraguard previously, however the device was removed in 2022 due to malposition. Thank you

## 2024-01-05 ENCOUNTER — TELEPHONE (OUTPATIENT)
Dept: OBSTETRICS AND GYNECOLOGY | Facility: CLINIC | Age: 29
End: 2024-01-05
Payer: COMMERCIAL

## 2024-01-05 RX ORDER — NORGESTIMATE AND ETHINYL ESTRADIOL 0.25-0.035
1 KIT ORAL DAILY
Qty: 28 TABLET | Refills: 1 | Status: SHIPPED | OUTPATIENT
Start: 2024-01-05

## 2024-01-05 NOTE — TELEPHONE ENCOUNTER
Called pt about n/s on 1/3/24, pt states she was in the office and her appointment was cancelled due to IUD not being ready and it needed to be verified with insurance.  Pt said she requested pills to be called to her pharmacy since she was told she had to wait  a month for her next period. There was no medication sent to RX. Please advise.    Elida

## 2024-01-08 NOTE — TELEPHONE ENCOUNTER
Prescription for Ortho-Cyclen sent to her pharmacy. She does not necessary need to be on her period as long as she is on birth control when we get the device in. Thanks!  From .      [email or web address]

## 2024-02-08 ENCOUNTER — PROCEDURE VISIT (OUTPATIENT)
Dept: OBSTETRICS AND GYNECOLOGY | Facility: CLINIC | Age: 29
End: 2024-02-08
Payer: COMMERCIAL

## 2024-02-08 VITALS
DIASTOLIC BLOOD PRESSURE: 74 MMHG | SYSTOLIC BLOOD PRESSURE: 119 MMHG | BODY MASS INDEX: 29.44 KG/M2 | HEIGHT: 62 IN | WEIGHT: 160 LBS

## 2024-02-08 DIAGNOSIS — Z30.430 ENCOUNTER FOR INSERTION OF PARAGARD IUD: Primary | ICD-10-CM

## 2024-02-08 RX ADMIN — COPPER: 313.4 INTRAUTERINE DEVICE INTRAUTERINE at 11:57

## 2024-02-08 NOTE — PROGRESS NOTES
Paragard IUD Insertion Procedure Note    Indications: Contraception    Pre Procedural Diagnosis: Encounter for Paragard intrauterine device insertion    Post Procedural Diagnosis: Same     Counseling:  Patient was counseled on risks of IUD insertion including but not limited to abnormal bleeding, infection, uterine perforation, expulsion, failure of contraception resulting in pregnancy, need for additional procedures and/or need for surgery.  All questions were answered to apparent satisfaction.  She was counseled about the duration of treatment, recommended removal in 10 years.  She was advised to perform monthly string checks and to see evaluation with unexpected changes in bleeding patterns and/or unable to feel the strings.      Procedure Details   Urine pregnancy test was done, and result was negative.  Gonorrhea/chlamydia testing was not done. A time out was performed.     Bimanual exam revealed Anteverted. Cervix cleansed with Betadine. Tenaculum applied to the anterior lip.  Uterus sounded to 9 cm. IUD inserted without difficulty. String visible and trimmed. Patient tolerated procedure well.    IUD Information:  See medication record.    Condition:  Stable    Complications:  None    Plan:    An ultrasound was performed post-procedure to confirm correct placement. The ultrasound noted the Paragard IUD to be in appropriate position in the endometrial cavity. The patient was advised to call for any fever or for prolonged or severe pain or bleeding; she was also advised to use a back up method of contraception for 7 days or abstain from intercourse. She was advised to use OTC ibuprofen as needed for mild to moderate pain.  Return to the clinic in 4 weeks for follow-up.    Estela Bo MD

## 2024-02-23 RX ORDER — COPPER 313.4 MG/1
INTRAUTERINE DEVICE INTRAUTERINE ONCE
Status: COMPLETED | OUTPATIENT
Start: 2024-02-08 | End: 2024-02-08

## 2024-02-26 ENCOUNTER — OFFICE VISIT (OUTPATIENT)
Dept: OBSTETRICS AND GYNECOLOGY | Facility: CLINIC | Age: 29
End: 2024-02-26
Payer: COMMERCIAL

## 2024-02-26 VITALS
DIASTOLIC BLOOD PRESSURE: 70 MMHG | BODY MASS INDEX: 29.08 KG/M2 | WEIGHT: 158 LBS | HEIGHT: 62 IN | SYSTOLIC BLOOD PRESSURE: 114 MMHG

## 2024-02-26 DIAGNOSIS — N93.9 VAGINAL BLEEDING: Primary | ICD-10-CM

## 2024-02-26 DIAGNOSIS — R10.2 PELVIC PAIN: ICD-10-CM

## 2024-02-26 LAB
BILIRUB BLD-MCNC: NEGATIVE MG/DL
GLUCOSE UR STRIP-MCNC: ABNORMAL MG/DL
KETONES UR QL: ABNORMAL
LEUKOCYTE EST, POC: ABNORMAL
NITRITE UR-MCNC: NEGATIVE MG/ML
PH UR: 6 [PH] (ref 5–8)
PROT UR STRIP-MCNC: ABNORMAL MG/DL
RBC # UR STRIP: ABNORMAL /UL
SP GR UR: 1.02 (ref 1–1.03)
UROBILINOGEN UR QL: NORMAL

## 2024-02-26 PROCEDURE — 99214 OFFICE O/P EST MOD 30 MIN: CPT | Performed by: NURSE PRACTITIONER

## 2024-02-26 PROCEDURE — 1160F RVW MEDS BY RX/DR IN RCRD: CPT | Performed by: NURSE PRACTITIONER

## 2024-02-26 PROCEDURE — 1159F MED LIST DOCD IN RCRD: CPT | Performed by: NURSE PRACTITIONER

## 2024-02-26 NOTE — PROGRESS NOTES
"Chief Complaint   Patient presents with    Follow-up     Pt states having some brown discharge since getting IUD placed on . States having abd and pelvic pain as well. Pt also wanted urine checked      SUBJECTIVE:     Parveen Hernandez is a 28 y.o.  who presents with c/o vaginal bleeding (brown discharge) and pelvic pain since placement of paragaurd IUD 24. This is a new problem. Describes pain as sharp at times other times pain will be cramping. Denies fevers, chills, N&V. She c/o feeling the urge to urine after voiding. Denies dysuria, frequency or urgency.     Past Medical History:   Diagnosis Date    Anxiety     H/O cold sores     Migraines       Past Surgical History:   Procedure Laterality Date     SECTION      TONSILLECTOMY          Review of Systems   Constitutional:  Negative for chills, fatigue and fever.   Gastrointestinal:  Negative for abdominal distention and abdominal pain.   Genitourinary:  Positive for pelvic pain, vaginal bleeding and vaginal discharge. Negative for dysuria, frequency, menstrual problem, urgency and vaginal pain.       OBJECTIVE:   Vitals:    24 1327   BP: 114/70   Weight: 71.7 kg (158 lb)   Height: 157.5 cm (62.01\")        Physical Exam  Constitutional:       General: She is not in acute distress.     Appearance: Normal appearance. She is not ill-appearing, toxic-appearing or diaphoretic.   Genitourinary:      Bladder and urethral meatus normal.      No lesions in the vagina.      Right Labia: No rash, tenderness, lesions, skin changes or Bartholin's cyst.     Left Labia: No tenderness, lesions, skin changes, Bartholin's cyst or rash.     No labial fusion noted.      No inguinal adenopathy present in the right or left side.     Vaginal bleeding (scant, oxidized) present.      No vaginal discharge, erythema, tenderness, ulceration or granulation tissue.      No vaginal prolapse present.     No vaginal atrophy present.       Right Adnexa: not tender, not " full, not palpable, no mass present and not absent.     Left Adnexa: not tender, not full, not palpable, no mass present and not absent.     No cervical motion tenderness, discharge, friability, lesion, polyp, nabothian cyst or eversion.      IUD strings visualized.      Uterus is not enlarged, fixed, tender, irregular or prolapsed.      No uterine mass detected.  Abdominal:      General: There is no distension.      Palpations: Abdomen is soft. There is no mass.      Tenderness: There is no abdominal tenderness. There is no guarding.      Hernia: No hernia is present. There is no hernia in the left inguinal area or right inguinal area.   Lymphadenopathy:      Lower Body: No right inguinal adenopathy. No left inguinal adenopathy.   Neurological:      General: No focal deficit present.      Mental Status: She is alert and oriented to person, place, and time.      Cranial Nerves: No cranial nerve deficit.   Skin:     General: Skin is warm and dry.   Vitals and nursing note reviewed.       Brief Urine Lab Results  (Last result in the past 365 days)        Color   Clarity   Blood   Leuk Est   Nitrite   Protein   CREAT   Urine HCG        02/26/24 1348     Large   Small (1+)   Negative   30 mg/dL                 Assessment/Plan    Diagnoses and all orders for this visit:    1. Vaginal bleeding (Primary)    2. Pelvic pain  -     NuSwab VG+ - Swab, Vagina  -     Urine Culture - Urine, Urine, Clean Catch  -     US Non-ob Transvaginal; Future  -     POC Urinalysis Dipstick    Discussed brown discharge is typical following placement of IUD, as is cramping, however will check to confirm IUD placement today as she has hx malposition IUD  Checking vaginal cultures to R/O infection  Urine Dip +Blood and leukocytes, negative nitrites, this was sent for culture  + Ketones, encouraged increased hydration with water  TVUS shows IUD in proper location    Return if symptoms worsen or fail to improve.    I spent 30 minutes caring for  Parveen on this date of service. This time includes time spent by me in the following activities: preparing for the visit, reviewing tests, obtaining and/or reviewing a separately obtained history, performing a medically appropriate examination and/or evaluation, counseling and educating the patient/family/caregiver, ordering medications, tests, or procedures, referring and communicating with other health care professionals, documenting information in the medical record, and independently interpreting results and communicating that information with the patient/family/caregiver    Luna Lopez, APRN  2/26/2024  13:53 EST

## 2024-02-29 ENCOUNTER — TELEPHONE (OUTPATIENT)
Dept: OBSTETRICS AND GYNECOLOGY | Facility: CLINIC | Age: 29
End: 2024-02-29
Payer: COMMERCIAL

## 2024-02-29 LAB
BACTERIA UR CULT: ABNORMAL
BACTERIA UR CULT: ABNORMAL
OTHER ANTIBIOTIC SUSC ISLT: ABNORMAL

## 2024-02-29 RX ORDER — NITROFURANTOIN 25; 75 MG/1; MG/1
100 CAPSULE ORAL 2 TIMES DAILY
Qty: 10 CAPSULE | Refills: 0 | Status: SHIPPED | OUTPATIENT
Start: 2024-02-29 | End: 2024-03-05

## 2024-02-29 NOTE — TELEPHONE ENCOUNTER
----- Message from JUAN Steiner sent at 2/29/2024  8:11 AM EST -----  Please let the pt know that her urine culture shows that she has a UTI. I have sent an antibiotic to her pharmacy to treat this. Thank you

## 2024-02-29 NOTE — PROGRESS NOTES
Please let the pt know that her urine culture shows that she has a UTI. I have sent an antibiotic to her pharmacy to treat this. Thank you

## 2024-08-07 ENCOUNTER — OFFICE VISIT (OUTPATIENT)
Dept: OBSTETRICS AND GYNECOLOGY | Facility: CLINIC | Age: 29
End: 2024-08-07
Payer: COMMERCIAL

## 2024-08-07 VITALS
HEIGHT: 62 IN | BODY MASS INDEX: 29.08 KG/M2 | WEIGHT: 158 LBS | DIASTOLIC BLOOD PRESSURE: 73 MMHG | SYSTOLIC BLOOD PRESSURE: 118 MMHG

## 2024-08-07 DIAGNOSIS — Z30.432 ENCOUNTER FOR IUD REMOVAL: Primary | ICD-10-CM

## 2024-08-07 DIAGNOSIS — Z30.09 ENCOUNTER FOR OTHER GENERAL COUNSELING OR ADVICE ON CONTRACEPTION: ICD-10-CM

## 2024-08-07 DIAGNOSIS — Z11.3 SCREENING FOR STD (SEXUALLY TRANSMITTED DISEASE): ICD-10-CM

## 2024-08-07 RX ORDER — LEVONORGESTREL/ETHINYL ESTRADIOL 2.6; 2.3 MG/1; MG/1
1 PATCH TRANSDERMAL WEEKLY
Qty: 12 PATCH | Refills: 3 | Status: SHIPPED | OUTPATIENT
Start: 2024-08-07

## 2024-08-07 NOTE — PROGRESS NOTES
CC:Here for IUD removal. I have reviewed the pt medical history, allergies, and medication list. She would like to discuss other contraceptive options. She is not interested in pills or DMPA. Has tried nexplanon with negative side effects. Denies history of migraine with aura, denies history of DVT, there is no family history of DVT. She is a nonsmoker. She would like STD screening today    Type of IUD:  ParaGard  Date of insertion:  2/8/24  Reason for removal:  due to intermittent spotting and bloating.   Other relevant history/information:  none    Procedure Time Out Documentation      Procedure Details  IUD strings visible:  yes  Local anesthesia:  None  Tenaculum used:  None  Removal:  IUD strings grasped and IUD removed intact with gentle traction.  The patient tolerated the procedure well.    All appropriate instructions regarding removal were reviewed.    Tolerated well  No apparent complications  Post procedure diagnosis : IUD removal     Plans for contraception:  Contraceptive patches    Other follow-up needed:  none    The patient was advised to call for any fever or for prolonged or severe pain or bleeding. She was advised to use NSAID as needed for mild to moderate pain.     Diagnoses and all orders for this visit:    1. Encounter for IUD removal (Primary)    2. Encounter for other general counseling or advice on contraception  -     Levonorgestrel-Eth Estradiol (Twirla) 120-30 MCG/24HR patch weekly; Place 1 each on the skin as directed by provider 1 (One) Time Per Week.  Dispense: 12 patch; Refill: 3    3. Screening for STD (sexually transmitted disease)  -     Chlamydia trachomatis, Neisseria gonorrhoeae, Trichomonas vaginalis, PCR - Swab, Cervix      IUD removed without difficulty   Discussed contraception options at length including pills, patch, vaginal ring, POPs,  injection, implant, and IUDs. The risks and benefits of the methods were discussed including but not limited to the increased risk of  heart attack, blood clot, and stroke.  It was discussed the contraception does not protect against sexually transmitted infections and condoms are encouraged. The patient desires to start patches. Discussed start up, uses, side effects, risks vs benefits. Encouraged condoms for the first 3 weeks of use as back up.    Vaginal cultures obtained, declines serum testing  Overdue for AE, encouraged to schedule with Dr Anupam Lopez, APRN  8/7/2024  14:46 EDT

## 2024-08-09 LAB
C TRACH RRNA SPEC QL NAA+PROBE: NEGATIVE
N GONORRHOEA RRNA SPEC QL NAA+PROBE: NEGATIVE
T VAGINALIS RRNA SPEC QL NAA+PROBE: NEGATIVE

## 2024-09-23 ENCOUNTER — TELEPHONE (OUTPATIENT)
Dept: OBSTETRICS AND GYNECOLOGY | Facility: CLINIC | Age: 29
End: 2024-09-23
Payer: COMMERCIAL